# Patient Record
Sex: FEMALE | Race: BLACK OR AFRICAN AMERICAN | Employment: PART TIME | URBAN - METROPOLITAN AREA
[De-identification: names, ages, dates, MRNs, and addresses within clinical notes are randomized per-mention and may not be internally consistent; named-entity substitution may affect disease eponyms.]

---

## 2017-05-27 ENCOUNTER — HOSPITAL ENCOUNTER (EMERGENCY)
Age: 37
Discharge: OTHER INSTITUTION | DRG: 460 | End: 2017-05-28
Attending: EMERGENCY MEDICINE
Payer: COMMERCIAL

## 2017-05-27 ENCOUNTER — APPOINTMENT (OUTPATIENT)
Dept: CT IMAGING | Age: 37
DRG: 460 | End: 2017-05-27
Attending: EMERGENCY MEDICINE
Payer: COMMERCIAL

## 2017-05-27 DIAGNOSIS — N17.9 ACUTE KIDNEY INJURY (HCC): ICD-10-CM

## 2017-05-27 DIAGNOSIS — K85.10 ACUTE BILIARY PANCREATITIS, UNSPECIFIED COMPLICATION STATUS: Primary | ICD-10-CM

## 2017-05-27 DIAGNOSIS — R17 JAUNDICE: ICD-10-CM

## 2017-05-27 DIAGNOSIS — K72.00 ACUTE LIVER FAILURE WITHOUT HEPATIC COMA: ICD-10-CM

## 2017-05-27 LAB
ALBUMIN SERPL BCP-MCNC: 2.1 G/DL (ref 3.5–5)
ALBUMIN/GLOB SERPL: 0.5 {RATIO} (ref 1.2–3.5)
ALP SERPL-CCNC: 845 U/L (ref 50–136)
ALT SERPL-CCNC: 274 U/L (ref 12–65)
ANION GAP BLD CALC-SCNC: 18 MMOL/L (ref 7–16)
AST SERPL W P-5'-P-CCNC: 378 U/L (ref 15–37)
BASOPHILS # BLD AUTO: 0.1 K/UL (ref 0–0.2)
BASOPHILS # BLD: 0 % (ref 0–2)
BILIRUB SERPL-MCNC: 36.2 MG/DL (ref 0.2–1.1)
BUN SERPL-MCNC: 91 MG/DL (ref 6–23)
CALCIUM SERPL-MCNC: 8.7 MG/DL (ref 8.3–10.4)
CHLORIDE SERPL-SCNC: 99 MMOL/L (ref 98–107)
CO2 SERPL-SCNC: 22 MMOL/L (ref 21–32)
CREAT SERPL-MCNC: 6.21 MG/DL (ref 0.6–1)
DIFFERENTIAL METHOD BLD: ABNORMAL
EOSINOPHIL # BLD: 0.1 K/UL (ref 0–0.8)
EOSINOPHIL NFR BLD: 1 % (ref 0.5–7.8)
ERYTHROCYTE [DISTWIDTH] IN BLOOD BY AUTOMATED COUNT: 24.8 % (ref 11.9–14.6)
GLOBULIN SER CALC-MCNC: 4.3 G/DL (ref 2.3–3.5)
GLUCOSE SERPL-MCNC: 144 MG/DL (ref 65–100)
HCG UR QL: NEGATIVE
HCT VFR BLD AUTO: 30.6 % (ref 35.8–46.3)
HGB BLD-MCNC: 11 G/DL (ref 11.7–15.4)
IMM GRANULOCYTES # BLD: 0.2 K/UL (ref 0–0.5)
IMM GRANULOCYTES NFR BLD AUTO: 1.3 % (ref 0–5)
INR PPP: 2 (ref 0.9–1.2)
LIPASE SERPL-CCNC: 3273 U/L (ref 73–393)
LYMPHOCYTES # BLD AUTO: 13 % (ref 13–44)
LYMPHOCYTES # BLD: 1.8 K/UL (ref 0.5–4.6)
MCH RBC QN AUTO: 29.7 PG (ref 26.1–32.9)
MCHC RBC AUTO-ENTMCNC: 36.9 G/DL (ref 31.4–35)
MCV RBC AUTO: 80.5 FL (ref 79.6–97.8)
MONOCYTES # BLD: 1.4 K/UL (ref 0.1–1.3)
MONOCYTES NFR BLD AUTO: 10 % (ref 4–12)
NEUTS SEG # BLD: 10.2 K/UL (ref 1.7–8.2)
NEUTS SEG NFR BLD AUTO: 75 % (ref 43–78)
PLATELET # BLD AUTO: 149 K/UL (ref 150–450)
PMV BLD AUTO: ABNORMAL FL (ref 10.8–14.1)
POTASSIUM SERPL-SCNC: 4.5 MMOL/L (ref 3.5–5.1)
PROT SERPL-MCNC: 6.4 G/DL (ref 6.3–8.2)
PROTHROMBIN TIME: 21.2 SEC (ref 9.6–12)
RBC # BLD AUTO: 3.8 M/UL (ref 4.05–5.25)
SODIUM SERPL-SCNC: 139 MMOL/L (ref 136–145)
WBC # BLD AUTO: 13.8 K/UL (ref 4.3–11.1)

## 2017-05-27 RX ORDER — SODIUM CHLORIDE 9 MG/ML
500 INJECTION, SOLUTION INTRAVENOUS ONCE
Status: DISCONTINUED | OUTPATIENT
Start: 2017-05-27 | End: 2017-05-28 | Stop reason: HOSPADM

## 2017-05-27 RX ORDER — ONDANSETRON 2 MG/ML
4 INJECTION INTRAMUSCULAR; INTRAVENOUS
Status: COMPLETED | OUTPATIENT
Start: 2017-05-27 | End: 2017-05-27

## 2017-05-27 RX ORDER — SODIUM CHLORIDE 9 MG/ML
125 INJECTION, SOLUTION INTRAVENOUS ONCE
Status: COMPLETED | OUTPATIENT
Start: 2017-05-27 | End: 2017-05-27

## 2017-05-27 RX ORDER — FUROSEMIDE 20 MG/1
TABLET ORAL DAILY
COMMUNITY
End: 2017-05-28

## 2017-05-27 RX ORDER — SPIRONOLACTONE 50 MG/1
TABLET, FILM COATED ORAL DAILY
COMMUNITY
End: 2017-05-28

## 2017-05-27 RX ORDER — HYDROMORPHONE HYDROCHLORIDE 1 MG/ML
0.5 INJECTION, SOLUTION INTRAMUSCULAR; INTRAVENOUS; SUBCUTANEOUS
Status: COMPLETED | OUTPATIENT
Start: 2017-05-27 | End: 2017-05-27

## 2017-05-27 RX ADMIN — SODIUM CHLORIDE 1000 ML: 900 INJECTION, SOLUTION INTRAVENOUS at 22:22

## 2017-05-27 RX ADMIN — HYDROMORPHONE HYDROCHLORIDE 0.5 MG: 1 INJECTION, SOLUTION INTRAMUSCULAR; INTRAVENOUS; SUBCUTANEOUS at 23:29

## 2017-05-27 RX ADMIN — SODIUM CHLORIDE 125 ML/HR: 900 INJECTION, SOLUTION INTRAVENOUS at 23:29

## 2017-05-27 RX ADMIN — ONDANSETRON 4 MG: 2 INJECTION INTRAMUSCULAR; INTRAVENOUS at 23:29

## 2017-05-28 ENCOUNTER — HOSPITAL ENCOUNTER (INPATIENT)
Age: 37
LOS: 1 days | Discharge: OTHER INSTITUTION | DRG: 460 | End: 2017-05-28
Attending: INTERNAL MEDICINE | Admitting: INTERNAL MEDICINE
Payer: COMMERCIAL

## 2017-05-28 ENCOUNTER — APPOINTMENT (OUTPATIENT)
Dept: ULTRASOUND IMAGING | Age: 37
DRG: 460 | End: 2017-05-28
Attending: INTERNAL MEDICINE
Payer: COMMERCIAL

## 2017-05-28 VITALS
TEMPERATURE: 98.1 F | DIASTOLIC BLOOD PRESSURE: 54 MMHG | WEIGHT: 144 LBS | BODY MASS INDEX: 24.72 KG/M2 | RESPIRATION RATE: 16 BRPM | OXYGEN SATURATION: 100 % | SYSTOLIC BLOOD PRESSURE: 91 MMHG | HEART RATE: 84 BPM

## 2017-05-28 VITALS
TEMPERATURE: 97.4 F | DIASTOLIC BLOOD PRESSURE: 52 MMHG | HEART RATE: 72 BPM | SYSTOLIC BLOOD PRESSURE: 96 MMHG | OXYGEN SATURATION: 96 % | RESPIRATION RATE: 18 BRPM

## 2017-05-28 PROBLEM — N17.9 AKI (ACUTE KIDNEY INJURY) (HCC): Status: ACTIVE | Noted: 2017-05-28

## 2017-05-28 PROBLEM — K75.4 AUTOIMMUNE HEPATITIS (HCC): Status: ACTIVE | Noted: 2017-05-28

## 2017-05-28 PROBLEM — K85.90 ACUTE PANCREATITIS: Status: ACTIVE | Noted: 2017-05-28

## 2017-05-28 PROBLEM — Z94.4 LIVER TRANSPLANT STATUS (HCC): Status: ACTIVE | Noted: 2017-05-28

## 2017-05-28 LAB
ALBUMIN SERPL BCP-MCNC: 1.8 G/DL (ref 3.5–5)
ALBUMIN/GLOB SERPL: 0.5 {RATIO} (ref 1.2–3.5)
ALP SERPL-CCNC: 833 U/L (ref 50–136)
ALT SERPL-CCNC: 239 U/L (ref 12–65)
ANION GAP BLD CALC-SCNC: 15 MMOL/L (ref 7–16)
APPEARANCE UR: CLEAR
AST SERPL W P-5'-P-CCNC: 324 U/L (ref 15–37)
BACTERIA URNS QL MICRO: ABNORMAL /HPF
BASOPHILS # BLD AUTO: 0 K/UL (ref 0–0.2)
BASOPHILS # BLD: 0 % (ref 0–2)
BILIRUB SERPL-MCNC: 30.2 MG/DL (ref 0.2–1.1)
BILIRUB UR QL: ABNORMAL
BUN SERPL-MCNC: 83 MG/DL (ref 6–23)
CALCIUM SERPL-MCNC: 8.3 MG/DL (ref 8.3–10.4)
CASTS URNS QL MICRO: ABNORMAL /LPF
CHLORIDE SERPL-SCNC: 106 MMOL/L (ref 98–107)
CO2 SERPL-SCNC: 21 MMOL/L (ref 21–32)
COLOR UR: ABNORMAL
CREAT SERPL-MCNC: 5.65 MG/DL (ref 0.6–1)
CREAT UR-MCNC: 56.1 MG/DL
DIFFERENTIAL METHOD BLD: ABNORMAL
EOSINOPHIL # BLD: 0.1 K/UL (ref 0–0.8)
EOSINOPHIL NFR BLD: 1 % (ref 0.5–7.8)
EPI CELLS #/AREA URNS HPF: ABNORMAL /HPF
ERYTHROCYTE [DISTWIDTH] IN BLOOD BY AUTOMATED COUNT: 25.9 % (ref 11.9–14.6)
GLOBULIN SER CALC-MCNC: 4 G/DL (ref 2.3–3.5)
GLUCOSE SERPL-MCNC: 96 MG/DL (ref 65–100)
GLUCOSE UR STRIP.AUTO-MCNC: NEGATIVE MG/DL
HCT VFR BLD AUTO: 31.5 % (ref 35.8–46.3)
HGB BLD-MCNC: 11.7 G/DL (ref 11.7–15.4)
HGB UR QL STRIP: NEGATIVE
IMM GRANULOCYTES # BLD: 0.1 K/UL (ref 0–0.5)
IMM GRANULOCYTES NFR BLD AUTO: 0.9 % (ref 0–5)
KETONES UR QL STRIP.AUTO: NEGATIVE MG/DL
LEUKOCYTE ESTERASE UR QL STRIP.AUTO: ABNORMAL
LIPASE SERPL-CCNC: 2380 U/L (ref 73–393)
LYMPHOCYTES # BLD AUTO: 16 % (ref 13–44)
LYMPHOCYTES # BLD: 1.8 K/UL (ref 0.5–4.6)
MCH RBC QN AUTO: 30 PG (ref 26.1–32.9)
MCHC RBC AUTO-ENTMCNC: 37.1 G/DL (ref 31.4–35)
MCV RBC AUTO: 80.8 FL (ref 79.6–97.8)
MONOCYTES # BLD: 1 K/UL (ref 0.1–1.3)
MONOCYTES NFR BLD AUTO: 9 % (ref 4–12)
NEUTS SEG # BLD: 8.3 K/UL (ref 1.7–8.2)
NEUTS SEG NFR BLD AUTO: 73 % (ref 43–78)
NITRITE UR QL STRIP.AUTO: NEGATIVE
PH UR STRIP: 6 [PH] (ref 5–9)
PLATELET # BLD AUTO: 98 K/UL (ref 150–450)
PMV BLD AUTO: ABNORMAL FL (ref 10.8–14.1)
POTASSIUM SERPL-SCNC: 3.6 MMOL/L (ref 3.5–5.1)
PROT SERPL-MCNC: 5.8 G/DL (ref 6.3–8.2)
PROT UR STRIP-MCNC: NEGATIVE MG/DL
RBC # BLD AUTO: 3.9 M/UL (ref 4.05–5.25)
RBC #/AREA URNS HPF: ABNORMAL /HPF
SODIUM SERPL-SCNC: 142 MMOL/L (ref 136–145)
SODIUM UR-SCNC: 78 MMOL/L
SP GR UR REFRACTOMETRY: 1.01 (ref 1–1.02)
UROBILINOGEN UR QL STRIP.AUTO: 0.2 EU/DL (ref 0.2–1)
WBC # BLD AUTO: 11.3 K/UL (ref 4.3–11.1)
WBC URNS QL MICRO: ABNORMAL /HPF

## 2017-05-28 PROCEDURE — 36415 COLL VENOUS BLD VENIPUNCTURE: CPT | Performed by: INTERNAL MEDICINE

## 2017-05-28 PROCEDURE — 65270000029 HC RM PRIVATE

## 2017-05-28 PROCEDURE — 84300 ASSAY OF URINE SODIUM: CPT | Performed by: INTERNAL MEDICINE

## 2017-05-28 PROCEDURE — 85025 COMPLETE CBC W/AUTO DIFF WBC: CPT | Performed by: INTERNAL MEDICINE

## 2017-05-28 PROCEDURE — 77030033269 HC SLV COMPR SCD KNE2 CARD -B

## 2017-05-28 PROCEDURE — 83690 ASSAY OF LIPASE: CPT | Performed by: INTERNAL MEDICINE

## 2017-05-28 PROCEDURE — 87086 URINE CULTURE/COLONY COUNT: CPT | Performed by: INTERNAL MEDICINE

## 2017-05-28 PROCEDURE — 81001 URINALYSIS AUTO W/SCOPE: CPT | Performed by: INTERNAL MEDICINE

## 2017-05-28 PROCEDURE — 82570 ASSAY OF URINE CREATININE: CPT | Performed by: INTERNAL MEDICINE

## 2017-05-28 PROCEDURE — 76700 US EXAM ABDOM COMPLETE: CPT

## 2017-05-28 PROCEDURE — 80053 COMPREHEN METABOLIC PANEL: CPT | Performed by: INTERNAL MEDICINE

## 2017-05-28 PROCEDURE — 74011250636 HC RX REV CODE- 250/636: Performed by: INTERNAL MEDICINE

## 2017-05-28 RX ORDER — SODIUM CHLORIDE 0.9 % (FLUSH) 0.9 %
5-10 SYRINGE (ML) INJECTION AS NEEDED
Status: DISCONTINUED | OUTPATIENT
Start: 2017-05-28 | End: 2017-05-29 | Stop reason: HOSPADM

## 2017-05-28 RX ORDER — OXYCODONE HYDROCHLORIDE 5 MG/1
2.5 TABLET ORAL
Qty: 10 TAB | Refills: 0 | Status: SHIPPED
Start: 2017-05-28 | End: 2017-10-31

## 2017-05-28 RX ORDER — SODIUM CHLORIDE 0.9 % (FLUSH) 0.9 %
5-10 SYRINGE (ML) INJECTION EVERY 8 HOURS
Status: DISCONTINUED | OUTPATIENT
Start: 2017-05-28 | End: 2017-05-29 | Stop reason: HOSPADM

## 2017-05-28 RX ORDER — ONDANSETRON 2 MG/ML
4 INJECTION INTRAMUSCULAR; INTRAVENOUS
Status: DISCONTINUED | OUTPATIENT
Start: 2017-05-28 | End: 2017-05-29 | Stop reason: HOSPADM

## 2017-05-28 RX ORDER — SODIUM CHLORIDE 9 MG/ML
125 INJECTION, SOLUTION INTRAVENOUS CONTINUOUS
Qty: 1000 ML | Refills: 1 | Status: SHIPPED
Start: 2017-05-28 | End: 2017-10-31

## 2017-05-28 RX ORDER — TACROLIMUS 1 MG/1
1 CAPSULE ORAL EVERY 12 HOURS
Status: DISCONTINUED | OUTPATIENT
Start: 2017-05-28 | End: 2017-05-29 | Stop reason: HOSPADM

## 2017-05-28 RX ORDER — SODIUM CHLORIDE 9 MG/ML
200 INJECTION, SOLUTION INTRAVENOUS CONTINUOUS
Status: DISCONTINUED | OUTPATIENT
Start: 2017-05-28 | End: 2017-05-28

## 2017-05-28 RX ORDER — ONDANSETRON 8 MG/1
8 TABLET, ORALLY DISINTEGRATING ORAL ONCE
Status: DISCONTINUED | OUTPATIENT
Start: 2017-05-28 | End: 2017-05-29 | Stop reason: HOSPADM

## 2017-05-28 RX ORDER — TACROLIMUS 1 MG/1
4 CAPSULE ORAL EVERY 12 HOURS
Status: DISCONTINUED | OUTPATIENT
Start: 2017-05-28 | End: 2017-05-28

## 2017-05-28 RX ORDER — SODIUM CHLORIDE 9 MG/ML
250 INJECTION, SOLUTION INTRAVENOUS CONTINUOUS
Status: DISPENSED | OUTPATIENT
Start: 2017-05-28 | End: 2017-05-28

## 2017-05-28 RX ORDER — ONDANSETRON 2 MG/ML
4 INJECTION INTRAMUSCULAR; INTRAVENOUS
Qty: 1 VIAL | Refills: 0 | Status: SHIPPED
Start: 2017-05-28 | End: 2017-10-31

## 2017-05-28 RX ORDER — OXYCODONE HYDROCHLORIDE 5 MG/1
2.5 TABLET ORAL
Status: DISCONTINUED | OUTPATIENT
Start: 2017-05-28 | End: 2017-05-29 | Stop reason: HOSPADM

## 2017-05-28 RX ORDER — SODIUM CHLORIDE 0.9 % (FLUSH) 0.9 %
5-10 SYRINGE (ML) INJECTION EVERY 8 HOURS
Qty: 1 SYRINGE | Refills: 3 | Status: SHIPPED
Start: 2017-05-28 | End: 2017-10-31

## 2017-05-28 RX ORDER — MYCOPHENOLATE MOFETIL 500 MG/1
1000 TABLET ORAL 2 TIMES DAILY
Status: DISCONTINUED | OUTPATIENT
Start: 2017-05-28 | End: 2017-05-29 | Stop reason: HOSPADM

## 2017-05-28 RX ADMIN — TACROLIMUS 1 MG: 1 CAPSULE ORAL at 20:51

## 2017-05-28 RX ADMIN — MYCOPHENOLATE MOFETIL 1000 MG: 500 TABLET, FILM COATED ORAL at 16:20

## 2017-05-28 RX ADMIN — Medication 10 ML: at 06:00

## 2017-05-28 RX ADMIN — Medication 10 ML: at 14:00

## 2017-05-28 RX ADMIN — METHYLPREDNISOLONE SODIUM SUCCINATE 40 MG: 40 INJECTION, POWDER, FOR SOLUTION INTRAMUSCULAR; INTRAVENOUS at 05:14

## 2017-05-28 RX ADMIN — TACROLIMUS 1 MG: 1 CAPSULE ORAL at 09:57

## 2017-05-28 RX ADMIN — METHYLPREDNISOLONE SODIUM SUCCINATE 40 MG: 40 INJECTION, POWDER, FOR SOLUTION INTRAMUSCULAR; INTRAVENOUS at 16:19

## 2017-05-28 RX ADMIN — SODIUM CHLORIDE 200 ML/HR: 900 INJECTION, SOLUTION INTRAVENOUS at 08:23

## 2017-05-28 RX ADMIN — METHYLPREDNISOLONE SODIUM SUCCINATE 40 MG: 40 INJECTION, POWDER, FOR SOLUTION INTRAMUSCULAR; INTRAVENOUS at 09:59

## 2017-05-28 RX ADMIN — MYCOPHENOLATE MOFETIL 1000 MG: 500 TABLET, FILM COATED ORAL at 09:57

## 2017-05-28 RX ADMIN — ONDANSETRON 4 MG: 2 INJECTION INTRAMUSCULAR; INTRAVENOUS at 20:51

## 2017-05-28 RX ADMIN — SODIUM CHLORIDE 200 ML/HR: 900 INJECTION, SOLUTION INTRAVENOUS at 04:00

## 2017-05-28 RX ADMIN — SODIUM CHLORIDE 250 ML/HR: 900 INJECTION, SOLUTION INTRAVENOUS at 16:23

## 2017-05-28 NOTE — ED NOTES
TRANSFER - OUT REPORT:    Verbal report given to Nathalia Montero RN(name) on Klarissa Aviles  being transferred to [facility] Rm 612(unit) for routine progression of care       Report consisted of patients Situation, Background, Assessment and   Recommendations(SBAR). Information from the following report(s) ED Summary was reviewed with the receiving nurse. Lines:   Peripheral IV 05/27/17 Right Hand (Active)   Site Assessment Clean, dry, & intact 5/27/2017 10:00 PM   Phlebitis Assessment 0 5/27/2017 10:00 PM   Infiltration Assessment 0 5/27/2017 10:00 PM        Opportunity for questions and clarification was provided.       Patient transported with:   IVF

## 2017-05-28 NOTE — ED NOTES
Agree w/ triage note, had a recent hospitalization at [facility]- [address] [place] [address] w/ DC summary on pt. She states that she began to see jaundice to B eyes since Easter but the color has deepened over the last couple of weeks. Increased N/V, abd distention though is soft at this time, family at [facility], will cont to monitor.

## 2017-05-28 NOTE — DISCHARGE SUMMARY
Patient ID:  Genie Morrow  [id number]  29 y.o.  1980  Admit date: 5/28/2017  2:40 AM  Discharge date and time: 5/28/2017  Attending: Lori Escudero MD  PCP:  Lenore Fountain MD  Treatment Team: Attending Provider: Lori Escudero MD; Consulting Provider: Melissa Moss MD    Principal Diagnosis MICHAEL (acute kidney injury) [facility])   Principal Problem:    MICHAEL (acute kidney [facility]) ([facility] (5/28/2017)    Active Problems:    Autoimmune hepatitis ([facility] (5/28/2017)      Liver transplant status ([facility] (5/28/2017)      Acute pancreatitis (5/28/2017)             Hospital Course:  Please refer to the admission H&P for details of presentation. In summary, the patient is Genie Morrow is a 44 y.o.  female who presents with LUQ abd pain with nausea and vomiting. Her abd pain started about 1 week ago and her NV started yesterday. She has hx of liver transplant due to autoimmune hepatitis in 2001. She has had multiple episodes of acute rejection and there has been questions of compliance with her meds. She was recently discharged from [address] in April for this problem. Her labs have shown elevated bili, LFT's but normal Creatine. On May 3rd, review of telephone encounters show that she was started on lasix 40 mg and aldactone 100 mg daily for ascites and edema. The patient reports she has lost 20 lbs since then. No fever or chills. She is also on prednisone taper for her autoimmune hepatitis. She has also had a biliary stent placed in 2002 for stenosis and was removed in 2003. CT here is without any acute findings.      She was admitted and started on symptomatic management. GI here was consulted given concern for hepatorenal syndrome. Lipase elevation and quality of abdominal pain more suggestive of acute pancreatitis. Non-contrasted CT abd/pelvis this admission negative changes of pancreatitis. RUQ US negative for biliary dilation. Urine sodium is 78 making hepatorenal syndrome unlikely.  Possible MICHAEL secondary to recently started diuretics. Patient negative for EtOH use and RUQ US does not suggest choledocholithiasis. Possible pancreatitis secondary to recent initiation of lasix. Abdominal pain is improving. Tolerating sips and chips. INR is 2.0. MELD is 40. GI spoke with Dr. Kika Ledesma at [address] and the patient has been accepted there for further management and treatment. Significant Diagnostic Studies:       Labs: Results:       Chemistry Recent Labs      05/28/17   0608  05/27/17   2200   GLU  96  144*   NA  142  139   K  3.6  4.5   CL  106  99   CO2  21  22   BUN  83*  91*   CREA  5.65*  6.21*   CA  8.3  8.7   AGAP  15  18*   AP  833*  845*   TP  5.8*  6.4   ALB  1.8*  2.1*   GLOB  4.0*  4.3*   AGRAT  0.5*  0.5*      CBC w/Diff Recent Labs      05/28/17   0608 05/27/17   2200   WBC  11.3*  13.8*   RBC  3.90*  3.80*   HGB  11.7  11.0*   HCT  31.5*  30.6*   PLT  98*  149*   GRANS  73  75   LYMPH  16  13   EOS  1  1      Cardiac Enzymes No results for input(s): CPK, CKND1, DAYO in the last 72 hours. No lab exists for component: CKRMB, TROIP   Coagulation Recent Labs      05/27/17   2200   PTP  21.2*   INR  2.0*       Lipid Panel No results found for: CHOL, CHOLPOCT, CHOLX, CHLST, CHOLV, [id number], HDL, LDL, NLDLCT, DLDL, LDLC, DLDLP, [id number], VLDLC, VLDL, TGL, TGLX, TRIGL, [id number], TRIGP, TGLPOCT, [id number], CHHD, CHHDX   BNP No results for input(s): BNPP in the last 72 hours. Liver Enzymes Recent Labs      05/28/17   0608   TP  5.8*   ALB  1.8*   AP  833*   SGOT  324*      Thyroid Studies No results found for: T4, T3U, TSH, TSHEXT       No results found for this or any previous visit. CT Results (most recent):    Results from Hospital Encounter encounter on 05/27/17   CT ABD PELV WO CONT   Narrative CT ABDOMEN AND PELVIS WITHOUT CONTRAST    HISTORY: Jaundice status post liver transplant    COMPARISON: None.     TECHNIQUE: Helical imaging was performed from the lung bases through the ischial  tuberosities without intravenous contrast. Oral contrast was not administered. Coronal and sagittal reformats were performed. Dose reduction techniques used: Automated exposure control, adjustment of the  mAs and/or kVp according to patient's size, and iterative reconstruction  techniques. FINDINGS:   *  LUNG BASES: Within normal limits. *  LIVER: Status post liver transplant. Small amount of right anterior  subcapsular fluid. A question this could be related to prior biopsy. *  GALLBLADDER AND BILE DUCTS: Status post post cholecystectomy. *  SPLEEN: Splenomegaly with the spleen measuring 14.0 cm.  *  URINARY TRACT: Within normal limits. *  ADRENALS: Within normal limits. *  PANCREAS: Within normal limits. *  GASTROINTESTINAL TRACT: Thickening of the right colon which can be related to  portal hypertension. *  RETROPERITONEUM: Within normal limits. *  PERITONEAL CAVITY AND ABDOMINAL WALL: Right subhepatic and pelvic ascites. *  PELVIS: Pelvic ascites. *  SPINE / BONES: Within normal limits. *  OTHER COMMENTS: None. Impression IMPRESSION:     Status post liver transplant. Small amount of right anterior hepatic subcapsular fluid possibly related to  prior biopsy. Splenomegaly. Status post cholecystectomy. Thickening the right colon which can be related to portal hypertension. Ascites. Evaluation of the abdominal viscera is limited without intravenous contrast.    Date of Dictation: 5/28/2017 12:14 AM        VAS/US Results (most recent):  No results found for this or any previous visit. XR Results (most recent):  No results found for this or any previous visit.     Discharge Exam:  Visit Vitals    BP 93/62    Pulse 68    Temp 97.4 °F (36.3 °C)    Resp 17    LMP 04/27/2017    SpO2 97%     General appearance: alert, cooperative, no distress, appears stated age  Lungs: clear to auscultation bilaterally  Heart: regular rate and rhythm, S1, S2 normal, no murmur, click, rub or gallop  Abdomen: soft, non-tender. Bowel sounds normal. No masses,  no organomegaly  Extremities: no cyanosis or edema  Neurologic: Grossly normal    Disposition: [facility]  Discharge Condition: stable  Patient Instructions:   Current Discharge Medication List      START taking these medications    Details   methylPREDNISolone, PF, (SOLU-MEDROL) 125 mg/2 mL solr 0.96 mL by IntraVENous route daily. Qty: 1 Vial, Refills: 0      ondansetron (ZOFRAN) 4 mg/2 mL soln 2 mL by IntraVENous route every four (4) hours as needed. Qty: 1 Vial, Refills: 0      oxyCODONE IR (ROXICODONE) 5 mg immediate release tablet Take 0.5 Tabs by mouth every four (4) hours as needed. Max Daily Amount: 15 mg.  Qty: 10 Tab, Refills: 0      sodium chloride (NS) 0.9 % 5-10 mL by IntraVENous route every eight (8) hours. Qty: 1 Syringe, Refills: 3      0.9% sodium chloride solution 125 mL/hr by IntraVENous route continuous. Qty: 1000 mL, Refills: 1         CONTINUE these medications which have NOT CHANGED    Details   CELLCEPT 500 mg Tab take 1,000 mg by mouth two (2) times a day. PROGRAF 1 mg Cap Take 1 mg by mouth two (2) times a day.          STOP taking these medications       PREDNISONE Comments:   Reason for Stopping:         spironolactone (ALDACTONE) 50 mg tablet Comments:   Reason for Stopping:         furosemide (LASIX) 20 mg tablet Comments:   Reason for Stopping:               Activity: As tolerated  Diet: NPO with sips of clear fluids and ice chips  Wound Care: none    Follow-up  ·   Dr. Jerome Berg  Time spent to discharge patient greater than 30 minutes  Signed:  Gary Moyer MD  5/28/2017  6:54 PM

## 2017-05-28 NOTE — CONSULTS
[facility] Consult Note       Referring Physician:  Dr. Eri Washburn. [place]    Consult Date:  2017    Admit Date:  2017    Chief Complaint:  Autoimmune hepatitis, Status-post OLT    Subjective:     History of Present Illness:  Patient is a 44 y.o. AAF with h/o AI hepatitis, hepatopulmonary syndrome, OLT in , and ascites who is seen in consultation at the request of Dr. Enrrique Roman for jaundice, abnormal LFT's, lipase elevation, N/V, and abdominal pain. Patient also noted to have MICHAEL with Cr 5.65 and urine Na 78. Patient recently admitted at [address] with worsening liver function. Was placed on prednisone as well as lasix/spironolactone. Reports significant weight loss following diuretics. Currently takes Prograf 1 mg daily and Cellcept 1g PO BID. Neither of the medications are new however patient reports Prograf recently decreased from 2 mg to 1 mg daily. Prior OLT complicated by biliary stricture requiring repeat ERCP with stent placement with last stent removed in . Lipase this admission elevated at 3272. CT abd/pelvis without contrast negative for changes pancreas. RUQ US negative for biliary dilation. Currently states abdominal pain and nausea much improved. Tolerating sips liquids and ice chips. Denies EtOH use. Negative for fevers/chills. Lasix was recently started. Denies new medications other than diuretics. Denies prior h/o pancreatitis. Positive BM today with small red blood on tissue. Denies other episodes of rectal bleeding and melena. Denies hematemesis. Reports that she is compliant with prograf/cellcept/steroids. Current INR is 2.0. MELD score is 40.     PMH:  Autoimmune hepatitis  Hepatopulmonary syndrome  Cervical cancer (LEAP)    PSH:  Past Surgical History:   Procedure Laterality Date    HX  SECTION      HX TRANSPLANT      liver   Liver transplant   Multiple ERCP's with stent in , for biliary stricture    Allergies:  No Known Allergies    Home Medications:  Prior to Admission medications    Medication Sig Start Date End Date Taking? Authorizing Provider   PREDNISONE by Does Not Apply route. Yes Coco Guerrero MD   spironolactone (ALDACTONE) 50 mg tablet Take  by mouth daily. Yes Coco Guerrero MD   CELLCEPT 500 mg Tab take 1,000 mg by mouth two (2) times a day. Yes Coco Guerrero MD   PROGRAF 1 mg Cap Take 1 mg by mouth two (2) times a day. Yes Coco Guerrero MD   furosemide (LASIX) 20 mg tablet Take  by mouth daily. Coco Guerrero MD       Hospital Medications:  Current Facility-Administered Medications   Medication Dose Route Frequency    mycophenolate (CELLCEPT) tablet 1,000 mg  1,000 mg Oral BID    sodium chloride (NS) flush 5-10 mL  5-10 mL IntraVENous Q8H    sodium chloride (NS) flush 5-10 mL  5-10 mL IntraVENous PRN    ondansetron (ZOFRAN) injection 4 mg  4 mg IntraVENous Q4H PRN    methylPREDNISolone (PF) (SOLU-MEDROL) injection 40 mg  40 mg IntraVENous Q6H    tacrolimus (PROGRAF) capsule 1 mg  1 mg Oral Q12H    oxyCODONE IR (ROXICODONE) tablet 2.5 mg  2.5 mg Oral Q4H PRN    0.9% sodium chloride infusion  250 mL/hr IntraVENous CONTINUOUS       Social History:  Social History   Substance Use Topics    Smoking status: Never Smoker    Smokeless tobacco: Not on file    Alcohol use No         Family History:  No family history on file. Review of Systems:  A detailed 10 system ROS is obtained, with pertinent positives as listed above. All others are negative. Diet:  Sips and chips    Objective:     Physical Exam:  Vitals:  Visit Vitals    BP (!) 88/60    Pulse 66    Temp 97.5 °F (36.4 °C)    Resp 18    LMP 04/27/2017    SpO2 99%     Gen:  No acute distress  HEENT:  Icteric sclera, mmm  Cardiovascular: Regular rate and rhythm.    Respiratory:  CTA bilaterally  GI:  Abd soft, ND, +BS, minimal epigastric tenderness, neg rebound/guarding  Rectal:  Deferred  Neurological:  AAOx3, Neg asterixis  Psychiatric: Full affect      Laboratory:    Recent Labs      05/28/17   0608  05/27/17   2200   WBC  11.3*  13.8*   HGB  11.7  11.0*   HCT  31.5*  30.6*   PLT  98*  149*   MCV  80.8  80.5   NA  142  139   K  3.6  4.5   CL  106  99   CO2  21  22   BUN  83*  91*   CREA  5.65*  6.21*   CA  8.3  8.7   GLU  96  144*   AP  833*  845*   SGOT  324*  378*   ALB  1.8*  2.1*   TP  5.8*  6.4   LPSE  2380*  3273*   PTP   --   21.2*   INR   --   2.0*          Assessment:       Principal Problem:    MICHAEL [facility]acute kidney injury) [facility] (5/28/2017)    Active Problems:    Autoimmune hepatitis ([facility] (5/28/2017)      L[facility] (5/28/2017)      Acute pancreatitis (5/28/2017)      Patient is a 43 yo female with h/o autoimmune hepatitis s/p OLT in 2002, multiple ERCP's for biliary stricture in 2002/2003, hepatopulmonary syndrome, ascites who is here with N/V, abdominal pain, elevated lipase consistent with pancreatitis, MICHAEL, and worsening of LFT's. Reports Prograf recently decreased from 2 mg to 1 mg daily. On Cellcept 1 g BID. Recent steroid taper. Lipase elevation and quality of abdominal pain suggestive of acute pancreatitis. Non-contrasted CT abd/pelvis this admission negative changes of pancreatitis. RUQ US negative for biliary dilation. Urine sodium is 78 making hepatorenal syndrome unlikely. Possible MICHAEL secondary to recently started diuretics. Patient negative for EtOH use and RUQ US does not suggest choledocholithiasis. Possible pancreatitis secondary to recent initiation of lasix. Abdominal pain is improving. Tolerating sips and chips. INR is 2.0. MELD is 40. Plan:       1. Holding diuretics  2. Continue IV fluids  3. Follow daily BUN/Cr. Will follow-up urine culture. 4. Continue Cellcept and Prograf  5. Reduce solumedrol to 60 mg IV daily  6. Daily CMP and INR  7. Advance diet in AM if abdominal pain continues to improve  8. Discussed case with Dr. Carin Thorne at [address] who accepted patient for transfer  9.  If there is a delay in obtaining a bed at [address] will consult Nephrology  10. Minimal rectal bleeding this AM with no recurrence. Suspect hemorrhoidal bleeding. Will monitor.     Savita Dewitt MD  [facility]

## 2017-05-28 NOTE — ED NOTES
MD currently at [facility], records from [address] pulled and available for review.

## 2017-05-28 NOTE — ED NOTES
Estefany Pete at [facility] to transport to [facility], pt assisted to bathroom prior to leaving via w/c. IVF remain in place, Maddie Grewal on 6th floor notified of pt departure.

## 2017-05-28 NOTE — H&P
History and Physical    Subjective:     Dorthea Rubinstein Major is a 44 y.o.  female who presents with LUQ abd pain with nausea and vomiting. Her abd pain started about 1 week ago and her NV started yesterday. She has hx of liver transplant due to autoimmune hepatitis in . She has had multiple episodes of acute rejection and there has been questions of compliance with her meds. She was recently discharged from [address] in April for this problem. Her labs have shown elevated bili, LFT's but normal Creatine. On May 3rd, review of telephone encounters show that she was started on lasix 40 mg and aldactone 100 mg daily for ascites and edema. The patient reports she has lost 20 lbs since then. No fever or chills. She is also on prednisone taper for her autoimmune hepatitis. She has also had a biliary stent placed in  for stenosis and was removed in . CT here is without any acute findings. Past Medical History:   Diagnosis Date    Infectious disease     hepatitis c    Liver disease     transplant       Past Surgical History:   Procedure Laterality Date    HX  SECTION      HX TRANSPLANT      liver     No family history on file. Social History   Substance Use Topics    Smoking status: Never Smoker    Smokeless tobacco: Not on file    Alcohol use No       Prior to Admission medications    Medication Sig Start Date End Date Taking? Authorizing Provider   PREDNISONE by Does Not Apply route. Coco Guerrero MD   furosemide (LASIX) 20 mg tablet Take  by mouth daily. Coco Guerrero MD   spironolactone (ALDACTONE) 50 mg tablet Take  by mouth daily. Coco Guerrero MD   CELLCEPT 500 mg Tab take 1,000 mg by mouth two (2) times a day. Coco Guerrero MD   PROGRAF 1 mg Cap take 4 mg by mouth every twelve (12) hours. Coco Guerrero MD     No Known Allergies     Review of Systems:  A comprehensive review of systems was negative except for that written in the History of Present Illness. Objective: Intake and Output:            Physical Exam:   Visit Vitals    LMP 04/27/2017     General appearance: alert, cooperative, no distress, appears stated age  Head: Normocephalic, without obvious abnormality, atraumatic  Eyes: positive findings: sclera icteric  Throat: abnormal findings: dry mm  Neck: supple, symmetrical, trachea midline, no adenopathy, thyroid: not enlarged, symmetric, no tenderness/mass/nodules, no carotid bruit and no JVD  Lungs: clear to auscultation bilaterally  Heart: regular rate and rhythm, S1, S2 normal, no murmur, click, rub or gallop  Abdomen: soft, non-tender. Bowel sounds normal. No masses,  no organomegaly  Extremities: extremities normal, atraumatic, no cyanosis or edema  Pulses: 2+ and symmetric  Skin: Skin color, texture, turgor normal. No rashes or lesions  Neurologic: Grossly normal        Data Review:   Recent Results (from the past 24 hour(s))   LIPASE    Collection Time: 05/27/17 10:00 PM   Result Value Ref Range    Lipase 3273 (H) 73 - 066 U/L   METABOLIC PANEL, COMPREHENSIVE    Collection Time: 05/27/17 10:00 PM   Result Value Ref Range    Sodium 139 136 - 145 mmol/L    Potassium 4.5 3.5 - 5.1 mmol/L    Chloride 99 98 - 107 mmol/L    CO2 22 21 - 32 mmol/L    Anion gap 18 (H) 7 - 16 mmol/L    Glucose 144 (H) 65 - 100 mg/dL    BUN 91 (H) 6 - 23 MG/DL    Creatinine 6.21 (H) 0.6 - 1.0 MG/DL    GFR est AA 10 (L) >60 ml/min/1.73m2    GFR est non-AA 8 (L) >60 ml/min/1.73m2    Calcium 8.7 8.3 - 10.4 MG/DL    Bilirubin, total 36.2 (H) 0.2 - 1.1 MG/DL    ALT (SGPT) 274 (H) 12 - 65 U/L    AST (SGOT) 378 (H) 15 - 37 U/L    Alk.  phosphatase 845 (H) 50 - 136 U/L    Protein, total 6.4 6.3 - 8.2 g/dL    Albumin 2.1 (L) 3.5 - 5.0 g/dL    Globulin 4.3 (H) 2.3 - 3.5 g/dL    A-G Ratio 0.5 (L) 1.2 - 3.5     CBC WITH AUTOMATED DIFF    Collection Time: 05/27/17 10:00 PM   Result Value Ref Range    WBC 13.8 (H) 4.3 - 11.1 K/uL    RBC 3.80 (L) 4.05 - 5.25 M/uL    HGB 11.0 (L) 11.7 - 15.4 g/dL    HCT 30.6 (L) 35.8 - 46.3 %    MCV 80.5 79.6 - 97.8 FL    MCH 29.7 26.1 - 32.9 PG    MCHC 36.9 (H) 31.4 - 35.0 g/dL    RDW 24.8 (H) 11.9 - 14.6 %    PLATELET 093 (L) 825 - 450 K/uL    MPV Cannot be calulated 10.8 - 14.1 FL    DF AUTOMATED      NEUTROPHILS 75 43 - 78 %    LYMPHOCYTES 13 13 - 44 %    MONOCYTES 10 4.0 - 12.0 %    EOSINOPHILS 1 0.5 - 7.8 %    BASOPHILS 0 0.0 - 2.0 %    IMMATURE GRANULOCYTES 1.3 0.0 - 5.0 %    ABS. NEUTROPHILS 10.2 (H) 1.7 - 8.2 K/UL    ABS. LYMPHOCYTES 1.8 0.5 - 4.6 K/UL    ABS. MONOCYTES 1.4 (H) 0.1 - 1.3 K/UL    ABS. EOSINOPHILS 0.1 0.0 - 0.8 K/UL    ABS. BASOPHILS 0.1 0.0 - 0.2 K/UL    ABS. IMM. GRANS. 0.2 0.0 - 0.5 K/UL   PROTHROMBIN TIME + INR    Collection Time: 05/27/17 10:00 PM   Result Value Ref Range    Prothrombin time 21.2 (H) 9.6 - 12.0 sec    INR 2.0 (H) 0.9 - 1.2     HCG URINE, QL. - POC    Collection Time: 05/27/17 11:42 PM   Result Value Ref Range    Pregnancy test,urine (POC) NEGATIVE  NEG           Assessment:     Principal Problem:    MICHAEL [facility]acute kidney [facility]) [facility] (5/28/2017)    Active Problems:    Autoimmune hepatitis ([facility] (5/28/2017)      Liver transplant status ([facility] (5/28/2017)      Acute pancreatitis (5/28/2017)        Plan:     Ad[place] bed [place]. I suspect her MICHAEL  Is due to over diuresis but certainly Hepatorenal syndrome could be in ddx but it has developed quickly and coincides with her diuretics starting. Resume antirejection meds  Start IV solumedrol for her autoimmune hepatitis. UA with lytes  Abd US to look for a possible stricture causing elevated lipase. May need MRCP or ERCP. Labs in AM.   Call Dr. Ambar Hayes (Heptologist at [facility] [place]) in AM for further recs or possible transfer.  [phone]  FULL CODE      Signed By: Júnior Chavez DO     May 28, 2017

## 2017-05-28 NOTE — PROGRESS NOTES
TRANSFER - IN REPORT:    Verbal report received from Mic[facility] on [address]  being received from [facility] for routine progression of care      Report consisted of patients Situation, Background, Assessment and   Recommendations(SBAR). Information from the following report(s) SBAR was reviewed with the receiving nurse. Opportunity for questions and clarification was provided. Assessment completed upon patients arrival to unit and care assumed.

## 2017-05-28 NOTE — PROGRESS NOTES
Dr. Alannah Montemayor notified of pt arrival from [place]. Pt oriented to room/floor and to call light. Instructed to call if needing any assistance; pt demonstrated understanding. SCD's in place; continuous IVF as ordered, U/A cup at bedside in which pt verbalizes understanding of needing to obtain a urine sample. Bed L/L, SR up X2, call light w/i reach, clutter-free environment. Pt states she is able to bring home meds if needed but none currently at bedside. Pt notified meds should be available from pharmacy. CNA obtaining VS at bedside. Admission assessment/ dual skin assessment completed with Rogelio Manriquez, OPAL at this time. Will continue to monitor.

## 2017-05-28 NOTE — PROGRESS NOTES
Care Management Interventions  Mode of Transport at Discharge: ALS [facility])  Transition of Care Consult (CM Consult): Other [facility] [address] phone [phone])  Plan discussed with Pt/Family/Caregiver: Yes  Freedom of Choice Offered: Yes  Discharge Location  Discharge Placement: Other: ([facility] [address] [facility]   Pt aware and in agreement with transfer to higher level of care.)

## 2017-05-28 NOTE — PROGRESS NOTES
Report called to Curt Mora RN at [address]. Supervisor and Primary MD notified. Social work is working on transfer time.

## 2017-05-28 NOTE — ED TRIAGE NOTES
Pt reports she had liver transplant about 15 years ago. Pt was told she was rejecting about 2 weeks ago at [address]. Pt reports jaundice has become worse as well as the bloating to her abdomen. Pt reports severe n/v.       Gisell Paris RN

## 2017-05-28 NOTE — ED PROVIDER NOTES
Patient is a 44 y.o. female presenting with abdominal pain. The history is provided by the patient. Abdominal Pain    This is a new problem. The current episode started more than 2 days ago. The problem occurs constantly. The problem has not changed since onset. Associated with: nausea. The pain is located in the epigastric region. The quality of the pain is aching. The pain is moderate. Associated symptoms include nausea. Pertinent negatives include no fever, no diarrhea, no melena, no vomiting, no constipation, no dysuria, no frequency, no headaches, no myalgias, no chest pain and no back pain. Nothing worsens the pain. The pain is relieved by nothing. liver transpl;ant 10 years ago       Past Medical History:   Diagnosis Date    Infectious disease     hepatitis c    Liver disease     transplant        Past Surgical History:   Procedure Laterality Date    HX  SECTION      HX TRANSPLANT      liver         History reviewed. No pertinent family history. Social History     Social History    Marital status: SINGLE     Spouse name: N/A    Number of children: N/A    Years of education: N/A     Occupational History    Not on file. Social History Main Topics    Smoking status: Never Smoker    Smokeless tobacco: Not on file    Alcohol use No    Drug use: No    Sexual activity: Yes     Partners: Male     Birth control/ protection: None     Other Topics Concern    Not on file     Social History Narrative         ALLERGIES: Review of patient's allergies indicates no known allergies. Review of Systems   Constitutional: Negative for chills and fever. HENT: Negative for congestion, rhinorrhea and sore throat. Eyes: Negative for photophobia and redness. Respiratory: Negative for cough and shortness of breath. Cardiovascular: Negative for chest pain and leg swelling. Gastrointestinal: Positive for abdominal pain and nausea. Negative for constipation, diarrhea, melena and vomiting. Endocrine: Negative for polydipsia and polyuria. Genitourinary: Negative for dysuria and frequency. Musculoskeletal: Negative for back pain and myalgias. Neurological: Negative for weakness, numbness and headaches. Vitals:    05/27/17 2128   BP: (!) 89/47   Pulse: 84   Resp: 16   Temp: 98.1 °F (36.7 °C)   SpO2: 97%   Weight: 65.3 kg (144 lb)            Physical Exam   Constitutional: She is oriented to person, place, and time. She appears well-developed and well-nourished. Eyes: Conjunctivae are normal. Pupils are equal, round, and reactive to light. Neck: Normal range of motion. Neck supple. Cardiovascular: Normal rate, regular rhythm and normal heart sounds. No murmur heard. Pulmonary/Chest: Breath sounds normal. No respiratory distress. Abdominal: Soft. She exhibits no distension. There is tenderness (epigastric and luq tenderness). There is no rebound and no guarding. Musculoskeletal: Normal range of motion. She exhibits no edema or tenderness. Neurological: She is alert and oriented to person, place, and time. She has normal strength. No sensory deficit. Skin: Skin is warm and dry. Nursing note and vitals reviewed. MDM  Number of Diagnoses or Management Options  Diagnosis management comments: Liver failure and kidney failure and acute pancreatitis. Discussed with hospitalist who will transfer patient downtown for continued management. IV hydration ordered. CT scan without contrast ordered.        Amount and/or Complexity of Data Reviewed  Clinical lab tests: ordered and reviewed (Results for orders placed or performed during the hospital encounter of 05/27/17  -LIPASE       Result                                            Value                         Ref Range                       Lipase                                            3273 (H)                      73 - 393 U/L               -METABOLIC PANEL, COMPREHENSIVE       Result Value                         Ref Range                       Sodium                                            139                           136 - 145 mmol/L                Potassium                                         4.5                           3.5 - 5.1 mmol/L                Chloride                                          99                            98 - 107 mmol/L                 CO2                                               22                            21 - 32 mmol/L                  Anion gap                                         18 (H)                        7 - 16 mmol/L                   Glucose                                           144 (H)                       65 - 100 mg/dL                  BUN                                               91 (H)                        6 - 23 MG/DL                    Creatinine                                        6.21 (H)                      0.6 - 1.0 MG/DL                 GFR est AA                                        10 (L)                        >60 ml/min/1.73m2               GFR est non-AA                                    8 (L)                         >60 ml/min/1.73m2               Calcium                                           8.7                           8.3 - 10.4 MG/DL                Bilirubin, total                                  36.2 (H)                      0.2 - 1.1 MG/DL                 ALT (SGPT)                                        274 (H)                       12 - 65 U/L                     AST (SGOT)                                        378 (H)                       15 - 37 U/L                     Alk. phosphatase                                  PENDING                       U/L                             Protein, total                                    6.4                           6.3 - 8.2 g/dL                  Albumin                                           2.1 (L)                       3.5 - 5.0 g/dL                  Globulin                                          4.3 (H)                       2.3 - 3.5 g/dL                  A-G Ratio                                         0.5 (L)                       1.2 - 3.5                  -CBC WITH AUTOMATED DIFF       Result                                            Value                         Ref Range                       WBC                                               13.8 (H)                      4.3 - 11.1 K/uL                 RBC                                               3.80 (L)                      4.05 - 5.25 M/uL                HGB                                               11.0 (L)                      11.7 - 15.4 g/dL                HCT                                               30.6 (L)                      35.8 - 46.3 %                   MCV                                               80.5                          79.6 - 97.8 FL                  MCH                                               29.7                          26.1 - 32.9 PG                  MCHC                                              36.9 (H)                      31.4 - 35.0 g/dL                RDW                                               24.8 (H)                      11.9 - 14.6 %                   PLATELET                                          149 (L)                       150 - 450 K/uL                  MPV                                               Cannot be calulated           10.8 - 14.1 FL                  DF                                                AUTOMATED                                                     NEUTROPHILS                                       75                            43 - 78 %                       LYMPHOCYTES                                       13                            13 - 44 %                       MONOCYTES                                         10                            4.0 - 12.0 % EOSINOPHILS                                       1                             0.5 - 7.8 %                     BASOPHILS                                         0                             0.0 - 2.0 %                     IMMATURE GRANULOCYTES                             1.3                           0.0 - 5.0 %                     ABS. NEUTROPHILS                                  10.2 (H)                      1.7 - 8.2 K/UL                  ABS. LYMPHOCYTES                                  1.8                           0.5 - 4.6 K/UL                  ABS. MONOCYTES                                    1.4 (H)                       0.1 - 1.3 K/UL                  ABS. EOSINOPHILS                                  0.1                           0.0 - 0.8 K/UL                  ABS. BASOPHILS                                    0.1                           0.0 - 0.2 K/UL                  ABS. IMM.  GRANS.                                  0.2                           0.0 - 0.5 K/UL             -PROTHROMBIN TIME + INR       Result                                            Value                         Ref Range                       Prothrombin time                                  21.2 (H)                      9.6 - 12.0 sec                  INR                                               2.0 (H)                       0.9 - 1.2                  )  Tests in the radiology section of CPT®: ordered and reviewed      ED Course       Procedures

## 2017-05-30 LAB
BACTERIA SPEC CULT: NORMAL
BACTERIA SPEC CULT: NORMAL
SERVICE CMNT-IMP: NORMAL

## 2017-10-31 PROBLEM — K85.90 ACUTE PANCREATITIS: Status: RESOLVED | Noted: 2017-05-28 | Resolved: 2017-10-31

## 2017-10-31 PROBLEM — Z94.0 HISTORY OF KIDNEY TRANSPLANT: Status: ACTIVE | Noted: 2017-10-31

## 2017-10-31 PROBLEM — T50.905A HYPERGLYCEMIA, DRUG-INDUCED: Status: ACTIVE | Noted: 2017-10-31

## 2017-10-31 PROBLEM — N17.9 AKI (ACUTE KIDNEY INJURY) (HCC): Status: RESOLVED | Noted: 2017-05-28 | Resolved: 2017-10-31

## 2017-10-31 PROBLEM — R73.9 HYPERGLYCEMIA, DRUG-INDUCED: Status: ACTIVE | Noted: 2017-10-31

## 2018-11-06 ENCOUNTER — APPOINTMENT (OUTPATIENT)
Dept: GENERAL RADIOLOGY | Age: 38
DRG: 720 | End: 2018-11-06
Attending: STUDENT IN AN ORGANIZED HEALTH CARE EDUCATION/TRAINING PROGRAM
Payer: MEDICAID

## 2018-11-06 ENCOUNTER — HOSPITAL ENCOUNTER (INPATIENT)
Age: 38
LOS: 3 days | Discharge: HOME | DRG: 720 | End: 2018-11-10
Attending: STUDENT IN AN ORGANIZED HEALTH CARE EDUCATION/TRAINING PROGRAM | Admitting: HOSPITALIST
Payer: MEDICAID

## 2018-11-06 DIAGNOSIS — Z94.4 HISTORY OF LIVER TRANSPLANT (HCC): ICD-10-CM

## 2018-11-06 DIAGNOSIS — Z94.0 HISTORY OF KIDNEY TRANSPLANT: ICD-10-CM

## 2018-11-06 DIAGNOSIS — R50.9 FEVER, UNSPECIFIED FEVER CAUSE: Primary | ICD-10-CM

## 2018-11-06 DIAGNOSIS — R65.10 SIRS (SYSTEMIC INFLAMMATORY RESPONSE SYNDROME) (HCC): ICD-10-CM

## 2018-11-06 LAB
ALBUMIN SERPL-MCNC: 3.1 G/DL (ref 3.5–5)
ALBUMIN/GLOB SERPL: 0.7 {RATIO} (ref 1.2–3.5)
ALP SERPL-CCNC: 77 U/L (ref 50–136)
ALT SERPL-CCNC: 35 U/L (ref 12–65)
AMMONIA PLAS-SCNC: 48 UMOL/L (ref 11–32)
ANION GAP SERPL CALC-SCNC: 12 MMOL/L (ref 7–16)
AST SERPL-CCNC: 27 U/L (ref 15–37)
BASOPHILS # BLD: 0 K/UL (ref 0–0.2)
BASOPHILS NFR BLD: 0 % (ref 0–2)
BILIRUB SERPL-MCNC: 0.8 MG/DL (ref 0.2–1.1)
BUN SERPL-MCNC: 19 MG/DL (ref 6–23)
CALCIUM SERPL-MCNC: 7.9 MG/DL (ref 8.3–10.4)
CHLORIDE SERPL-SCNC: 101 MMOL/L (ref 98–107)
CO2 SERPL-SCNC: 21 MMOL/L (ref 21–32)
CREAT SERPL-MCNC: 1.86 MG/DL (ref 0.6–1)
DIFFERENTIAL METHOD BLD: ABNORMAL
EOSINOPHIL # BLD: 0 K/UL (ref 0–0.8)
EOSINOPHIL NFR BLD: 0 % (ref 0.5–7.8)
ERYTHROCYTE [DISTWIDTH] IN BLOOD BY AUTOMATED COUNT: 13.2 %
GLOBULIN SER CALC-MCNC: 4.4 G/DL (ref 2.3–3.5)
GLUCOSE SERPL-MCNC: 166 MG/DL (ref 65–100)
HCT VFR BLD AUTO: 33 % (ref 35.8–46.3)
HGB BLD-MCNC: 11 G/DL (ref 11.7–15.4)
IMM GRANULOCYTES # BLD: 0.1 K/UL (ref 0–0.5)
IMM GRANULOCYTES NFR BLD AUTO: 1 % (ref 0–5)
INR BLD: 1.4 (ref 0.9–1.2)
LACTATE BLD-SCNC: 0.99 MMOL/L (ref 0.5–1.9)
LYMPHOCYTES # BLD: 0.9 K/UL (ref 0.5–4.6)
LYMPHOCYTES NFR BLD: 8 % (ref 13–44)
MCH RBC QN AUTO: 30.2 PG (ref 26.1–32.9)
MCHC RBC AUTO-ENTMCNC: 33.3 G/DL (ref 31.4–35)
MCV RBC AUTO: 90.7 FL (ref 79.6–97.8)
MONOCYTES # BLD: 1.1 K/UL (ref 0.1–1.3)
MONOCYTES NFR BLD: 10 % (ref 4–12)
NEUTS SEG # BLD: 8.8 K/UL (ref 1.7–8.2)
NEUTS SEG NFR BLD: 81 % (ref 43–78)
NRBC # BLD: 0 K/UL (ref 0–0.2)
PLATELET # BLD AUTO: 141 K/UL (ref 150–450)
PMV BLD AUTO: 12.1 FL (ref 9.4–12.3)
POTASSIUM SERPL-SCNC: 3.6 MMOL/L (ref 3.5–5.1)
PROCALCITONIN SERPL-MCNC: 11 NG/ML
PROT SERPL-MCNC: 7.5 G/DL (ref 6.3–8.2)
PT BLD: 16.5 SECS (ref 9.6–11.6)
RBC # BLD AUTO: 3.64 M/UL (ref 4.05–5.2)
SODIUM SERPL-SCNC: 134 MMOL/L (ref 136–145)
WBC # BLD AUTO: 10.9 K/UL (ref 4.3–11.1)

## 2018-11-06 PROCEDURE — 87077 CULTURE AEROBIC IDENTIFY: CPT

## 2018-11-06 PROCEDURE — 83605 ASSAY OF LACTIC ACID: CPT

## 2018-11-06 PROCEDURE — 93005 ELECTROCARDIOGRAM TRACING: CPT | Performed by: EMERGENCY MEDICINE

## 2018-11-06 PROCEDURE — 85025 COMPLETE CBC W/AUTO DIFF WBC: CPT

## 2018-11-06 PROCEDURE — 99285 EMERGENCY DEPT VISIT HI MDM: CPT | Performed by: STUDENT IN AN ORGANIZED HEALTH CARE EDUCATION/TRAINING PROGRAM

## 2018-11-06 PROCEDURE — 87186 SC STD MICRODIL/AGAR DIL: CPT

## 2018-11-06 PROCEDURE — 87205 SMEAR GRAM STAIN: CPT

## 2018-11-06 PROCEDURE — 74011250637 HC RX REV CODE- 250/637: Performed by: STUDENT IN AN ORGANIZED HEALTH CARE EDUCATION/TRAINING PROGRAM

## 2018-11-06 PROCEDURE — 85610 PROTHROMBIN TIME: CPT

## 2018-11-06 PROCEDURE — 83690 ASSAY OF LIPASE: CPT

## 2018-11-06 PROCEDURE — 87804 INFLUENZA ASSAY W/OPTIC: CPT

## 2018-11-06 PROCEDURE — 74011000258 HC RX REV CODE- 258: Performed by: STUDENT IN AN ORGANIZED HEALTH CARE EDUCATION/TRAINING PROGRAM

## 2018-11-06 PROCEDURE — 96365 THER/PROPH/DIAG IV INF INIT: CPT | Performed by: STUDENT IN AN ORGANIZED HEALTH CARE EDUCATION/TRAINING PROGRAM

## 2018-11-06 PROCEDURE — 74011250636 HC RX REV CODE- 250/636: Performed by: STUDENT IN AN ORGANIZED HEALTH CARE EDUCATION/TRAINING PROGRAM

## 2018-11-06 PROCEDURE — 71046 X-RAY EXAM CHEST 2 VIEWS: CPT

## 2018-11-06 PROCEDURE — 80053 COMPREHEN METABOLIC PANEL: CPT

## 2018-11-06 PROCEDURE — 84145 PROCALCITONIN (PCT): CPT

## 2018-11-06 PROCEDURE — 87040 BLOOD CULTURE FOR BACTERIA: CPT

## 2018-11-06 PROCEDURE — 82140 ASSAY OF AMMONIA: CPT

## 2018-11-06 RX ORDER — VANCOMYCIN 1.75 GRAM/500 ML IN 0.9 % SODIUM CHLORIDE INTRAVENOUS
1750 ONCE
Status: COMPLETED | OUTPATIENT
Start: 2018-11-06 | End: 2018-11-07

## 2018-11-06 RX ORDER — ONDANSETRON 2 MG/ML
4 INJECTION INTRAMUSCULAR; INTRAVENOUS
Status: COMPLETED | OUTPATIENT
Start: 2018-11-06 | End: 2018-11-07

## 2018-11-06 RX ORDER — ACETAMINOPHEN 500 MG
1000 TABLET ORAL
Status: COMPLETED | OUTPATIENT
Start: 2018-11-06 | End: 2018-11-06

## 2018-11-06 RX ADMIN — ACETAMINOPHEN 1000 MG: 500 TABLET, FILM COATED ORAL at 22:59

## 2018-11-06 RX ADMIN — PIPERACILLIN SODIUM,TAZOBACTAM SODIUM 4.5 G: 4; .5 INJECTION, POWDER, FOR SOLUTION INTRAVENOUS at 23:41

## 2018-11-06 RX ADMIN — SODIUM CHLORIDE 1000 ML: 900 INJECTION, SOLUTION INTRAVENOUS at 23:00

## 2018-11-07 ENCOUNTER — APPOINTMENT (OUTPATIENT)
Dept: ULTRASOUND IMAGING | Age: 38
DRG: 720 | End: 2018-11-07
Attending: INTERNAL MEDICINE
Payer: MEDICAID

## 2018-11-07 PROBLEM — A41.9 SEPSIS DUE TO UNDETERMINED ORGANISM, WITH ACUTE RENAL FAILURE (HCC): Status: ACTIVE | Noted: 2018-11-07

## 2018-11-07 PROBLEM — N17.9 SEPSIS DUE TO UNDETERMINED ORGANISM, WITH ACUTE RENAL FAILURE (HCC): Status: ACTIVE | Noted: 2018-11-07

## 2018-11-07 PROBLEM — R50.9 FEVER: Status: ACTIVE | Noted: 2018-11-07

## 2018-11-07 PROBLEM — I10 HTN (HYPERTENSION): Status: ACTIVE | Noted: 2018-11-07

## 2018-11-07 PROBLEM — R65.20 SEPSIS DUE TO UNDETERMINED ORGANISM, WITH ACUTE RENAL FAILURE (HCC): Status: ACTIVE | Noted: 2018-11-07

## 2018-11-07 LAB
ALBUMIN SERPL-MCNC: 2.7 G/DL (ref 3.5–5)
ALBUMIN/GLOB SERPL: 0.7 {RATIO} (ref 1.2–3.5)
ALP SERPL-CCNC: 71 U/L (ref 50–136)
ALT SERPL-CCNC: 31 U/L (ref 12–65)
ANION GAP SERPL CALC-SCNC: 11 MMOL/L (ref 7–16)
ANION GAP SERPL CALC-SCNC: 12 MMOL/L (ref 7–16)
ANION GAP SERPL CALC-SCNC: 9 MMOL/L (ref 7–16)
APPEARANCE UR: ABNORMAL
AST SERPL-CCNC: 20 U/L (ref 15–37)
ATRIAL RATE: 111 BPM
BACTERIA URNS QL MICRO: ABNORMAL /HPF
BASOPHILS # BLD: 0 K/UL (ref 0–0.2)
BASOPHILS NFR BLD: 0 % (ref 0–2)
BILIRUB SERPL-MCNC: 0.8 MG/DL (ref 0.2–1.1)
BILIRUB UR QL: NEGATIVE
BUN SERPL-MCNC: 19 MG/DL (ref 6–23)
BUN SERPL-MCNC: 19 MG/DL (ref 6–23)
BUN SERPL-MCNC: 20 MG/DL (ref 6–23)
CALCIUM SERPL-MCNC: 7.3 MG/DL (ref 8.3–10.4)
CALCIUM SERPL-MCNC: 7.3 MG/DL (ref 8.3–10.4)
CALCIUM SERPL-MCNC: 7.4 MG/DL (ref 8.3–10.4)
CALCULATED P AXIS, ECG09: 59 DEGREES
CALCULATED R AXIS, ECG10: 53 DEGREES
CALCULATED T AXIS, ECG11: 46 DEGREES
CHLORIDE SERPL-SCNC: 106 MMOL/L (ref 98–107)
CHLORIDE SERPL-SCNC: 107 MMOL/L (ref 98–107)
CHLORIDE SERPL-SCNC: 110 MMOL/L (ref 98–107)
CK SERPL-CCNC: 123 U/L (ref 21–215)
CO2 SERPL-SCNC: 20 MMOL/L (ref 21–32)
CO2 SERPL-SCNC: 21 MMOL/L (ref 21–32)
CO2 SERPL-SCNC: 22 MMOL/L (ref 21–32)
COLOR UR: YELLOW
CREAT SERPL-MCNC: 1.85 MG/DL (ref 0.6–1)
CREAT SERPL-MCNC: 1.9 MG/DL (ref 0.6–1)
CREAT SERPL-MCNC: 1.94 MG/DL (ref 0.6–1)
CRP SERPL-MCNC: 24.7 MG/DL (ref 0–0.9)
DIAGNOSIS, 93000: NORMAL
DIFFERENTIAL METHOD BLD: ABNORMAL
EOSINOPHIL # BLD: 0 K/UL (ref 0–0.8)
EOSINOPHIL #/AREA URNS HPF: NEGATIVE /[HPF]
EOSINOPHIL NFR BLD: 0 % (ref 0.5–7.8)
EPI CELLS #/AREA URNS HPF: ABNORMAL /HPF
ERYTHROCYTE [DISTWIDTH] IN BLOOD BY AUTOMATED COUNT: 12.9 %
ERYTHROCYTE [SEDIMENTATION RATE] IN BLOOD: 94 MM/HR (ref 0–20)
FLUAV AG NPH QL IA: NEGATIVE
FLUBV AG NPH QL IA: NEGATIVE
GLOBULIN SER CALC-MCNC: 4 G/DL (ref 2.3–3.5)
GLUCOSE SERPL-MCNC: 135 MG/DL (ref 65–100)
GLUCOSE SERPL-MCNC: 144 MG/DL (ref 65–100)
GLUCOSE SERPL-MCNC: 187 MG/DL (ref 65–100)
GLUCOSE UR STRIP.AUTO-MCNC: NEGATIVE MG/DL
HCT VFR BLD AUTO: 31 % (ref 35.8–46.3)
HGB BLD-MCNC: 10.5 G/DL (ref 11.7–15.4)
HGB BLD-MCNC: 9.8 G/DL (ref 11.7–15.4)
HGB UR QL STRIP: ABNORMAL
IMM GRANULOCYTES # BLD: 0 K/UL (ref 0–0.5)
IMM GRANULOCYTES NFR BLD AUTO: 0 % (ref 0–5)
KETONES UR QL STRIP.AUTO: NEGATIVE MG/DL
LEUKOCYTE ESTERASE UR QL STRIP.AUTO: ABNORMAL
LIPASE SERPL-CCNC: 52 U/L (ref 73–393)
LYMPHOCYTES # BLD: 0.6 K/UL (ref 0.5–4.6)
LYMPHOCYTES NFR BLD: 8 % (ref 13–44)
MAGNESIUM SERPL-MCNC: 1.8 MG/DL (ref 1.8–2.4)
MCH RBC QN AUTO: 31.2 PG (ref 26.1–32.9)
MCHC RBC AUTO-ENTMCNC: 33.9 G/DL (ref 31.4–35)
MCV RBC AUTO: 92 FL (ref 79.6–97.8)
MONOCYTES # BLD: 1 K/UL (ref 0.1–1.3)
MONOCYTES NFR BLD: 13 % (ref 4–12)
NEUTS SEG # BLD: 5.8 K/UL (ref 1.7–8.2)
NEUTS SEG NFR BLD: 79 % (ref 43–78)
NITRITE UR QL STRIP.AUTO: NEGATIVE
NRBC # BLD: 0 K/UL (ref 0–0.2)
P-R INTERVAL, ECG05: 130 MS
PH UR STRIP: 6.5 [PH] (ref 5–9)
PHOSPHATE SERPL-MCNC: 1.4 MG/DL (ref 2.5–4.5)
PLATELET # BLD AUTO: 82 K/UL (ref 150–450)
PMV BLD AUTO: 12.4 FL (ref 9.4–12.3)
POTASSIUM SERPL-SCNC: 3.2 MMOL/L (ref 3.5–5.1)
POTASSIUM SERPL-SCNC: 3.3 MMOL/L (ref 3.5–5.1)
POTASSIUM SERPL-SCNC: 3.4 MMOL/L (ref 3.5–5.1)
PROT SERPL-MCNC: 6.7 G/DL (ref 6.3–8.2)
PROT UR STRIP-MCNC: 100 MG/DL
Q-T INTERVAL, ECG07: 298 MS
QRS DURATION, ECG06: 70 MS
QTC CALCULATION (BEZET), ECG08: 405 MS
RBC # BLD AUTO: 3.37 M/UL (ref 4.05–5.2)
RBC #/AREA URNS HPF: ABNORMAL /HPF
SODIUM SERPL-SCNC: 138 MMOL/L (ref 136–145)
SODIUM SERPL-SCNC: 139 MMOL/L (ref 136–145)
SODIUM SERPL-SCNC: 141 MMOL/L (ref 136–145)
SP GR UR REFRACTOMETRY: 1.01 (ref 1–1.02)
SPECIMEN SOURCE: NORMAL
UROBILINOGEN UR QL STRIP.AUTO: 1 EU/DL (ref 0.2–1)
VENTRICULAR RATE, ECG03: 111 BPM
WBC # BLD AUTO: 7.4 K/UL (ref 4.3–11.1)
WBC URNS QL MICRO: ABNORMAL /HPF
YEAST URNS QL MICRO: ABNORMAL

## 2018-11-07 PROCEDURE — 74011636637 HC RX REV CODE- 636/637: Performed by: HOSPITALIST

## 2018-11-07 PROCEDURE — 84100 ASSAY OF PHOSPHORUS: CPT

## 2018-11-07 PROCEDURE — 36415 COLL VENOUS BLD VENIPUNCTURE: CPT

## 2018-11-07 PROCEDURE — 96375 TX/PRO/DX INJ NEW DRUG ADDON: CPT | Performed by: STUDENT IN AN ORGANIZED HEALTH CARE EDUCATION/TRAINING PROGRAM

## 2018-11-07 PROCEDURE — 83735 ASSAY OF MAGNESIUM: CPT

## 2018-11-07 PROCEDURE — 80053 COMPREHEN METABOLIC PANEL: CPT

## 2018-11-07 PROCEDURE — 74011250636 HC RX REV CODE- 250/636: Performed by: INTERNAL MEDICINE

## 2018-11-07 PROCEDURE — 74011250636 HC RX REV CODE- 250/636: Performed by: HOSPITALIST

## 2018-11-07 PROCEDURE — 87205 SMEAR GRAM STAIN: CPT

## 2018-11-07 PROCEDURE — 74011250637 HC RX REV CODE- 250/637: Performed by: FAMILY MEDICINE

## 2018-11-07 PROCEDURE — 87086 URINE CULTURE/COLONY COUNT: CPT

## 2018-11-07 PROCEDURE — 85018 HEMOGLOBIN: CPT

## 2018-11-07 PROCEDURE — 74011250636 HC RX REV CODE- 250/636: Performed by: STUDENT IN AN ORGANIZED HEALTH CARE EDUCATION/TRAINING PROGRAM

## 2018-11-07 PROCEDURE — 76776 US EXAM K TRANSPL W/DOPPLER: CPT

## 2018-11-07 PROCEDURE — 86140 C-REACTIVE PROTEIN: CPT

## 2018-11-07 PROCEDURE — 74011250637 HC RX REV CODE- 250/637: Performed by: HOSPITALIST

## 2018-11-07 PROCEDURE — 81001 URINALYSIS AUTO W/SCOPE: CPT

## 2018-11-07 PROCEDURE — 85025 COMPLETE CBC W/AUTO DIFF WBC: CPT

## 2018-11-07 PROCEDURE — 82550 ASSAY OF CK (CPK): CPT

## 2018-11-07 PROCEDURE — 85652 RBC SED RATE AUTOMATED: CPT

## 2018-11-07 PROCEDURE — 86710 INFLUENZA VIRUS ANTIBODY: CPT

## 2018-11-07 PROCEDURE — 77030020263 HC SOL INJ SOD CL0.9% LFCR 1000ML

## 2018-11-07 PROCEDURE — 80048 BASIC METABOLIC PNL TOTAL CA: CPT

## 2018-11-07 PROCEDURE — 88185 FLOWCYTOMETRY/TC ADD-ON: CPT

## 2018-11-07 PROCEDURE — 74011000258 HC RX REV CODE- 258: Performed by: HOSPITALIST

## 2018-11-07 PROCEDURE — 65660000000 HC RM CCU STEPDOWN

## 2018-11-07 RX ORDER — PREDNISONE 10 MG/1
10 TABLET ORAL
Status: DISCONTINUED | OUTPATIENT
Start: 2018-11-07 | End: 2018-11-07

## 2018-11-07 RX ORDER — ONDANSETRON 2 MG/ML
4 INJECTION INTRAMUSCULAR; INTRAVENOUS
Status: DISCONTINUED | OUTPATIENT
Start: 2018-11-07 | End: 2018-11-10 | Stop reason: HOSPADM

## 2018-11-07 RX ORDER — SODIUM CHLORIDE 0.9 % (FLUSH) 0.9 %
5-10 SYRINGE (ML) INJECTION EVERY 8 HOURS
Status: DISCONTINUED | OUTPATIENT
Start: 2018-11-07 | End: 2018-11-10 | Stop reason: HOSPADM

## 2018-11-07 RX ORDER — TACROLIMUS 1 MG/1
1 CAPSULE ORAL EVERY 12 HOURS
Status: DISCONTINUED | OUTPATIENT
Start: 2018-11-07 | End: 2018-11-07 | Stop reason: SDUPTHER

## 2018-11-07 RX ORDER — SODIUM CHLORIDE 9 MG/ML
100 INJECTION, SOLUTION INTRAVENOUS CONTINUOUS
Status: DISCONTINUED | OUTPATIENT
Start: 2018-11-07 | End: 2018-11-09 | Stop reason: ALTCHOICE

## 2018-11-07 RX ORDER — MYCOPHENOLATE MOFETIL 500 MG/1
1000 TABLET ORAL
Status: DISCONTINUED | OUTPATIENT
Start: 2018-11-07 | End: 2018-11-08

## 2018-11-07 RX ORDER — SODIUM CHLORIDE 0.9 % (FLUSH) 0.9 %
5-10 SYRINGE (ML) INJECTION AS NEEDED
Status: DISCONTINUED | OUTPATIENT
Start: 2018-11-07 | End: 2018-11-10 | Stop reason: HOSPADM

## 2018-11-07 RX ORDER — TACROLIMUS 1 MG/1
6 CAPSULE ORAL 2 TIMES DAILY
Status: DISCONTINUED | OUTPATIENT
Start: 2018-11-07 | End: 2018-11-10 | Stop reason: HOSPADM

## 2018-11-07 RX ORDER — TACROLIMUS 1 MG/1
1 CAPSULE ORAL 2 TIMES DAILY
Status: DISCONTINUED | OUTPATIENT
Start: 2018-11-07 | End: 2018-11-07

## 2018-11-07 RX ORDER — AMLODIPINE BESYLATE 10 MG/1
10 TABLET ORAL DAILY
Status: DISCONTINUED | OUTPATIENT
Start: 2018-11-07 | End: 2018-11-10 | Stop reason: HOSPADM

## 2018-11-07 RX ORDER — ACETAMINOPHEN 325 MG/1
650 TABLET ORAL
Status: DISCONTINUED | OUTPATIENT
Start: 2018-11-07 | End: 2018-11-10 | Stop reason: HOSPADM

## 2018-11-07 RX ADMIN — PREDNISONE 10 MG: 10 TABLET ORAL at 09:28

## 2018-11-07 RX ADMIN — PIPERACILLIN SODIUM,TAZOBACTAM SODIUM 4.5 G: 4; .5 INJECTION, POWDER, FOR SOLUTION INTRAVENOUS at 17:25

## 2018-11-07 RX ADMIN — Medication 10 ML: at 03:00

## 2018-11-07 RX ADMIN — PIPERACILLIN SODIUM,TAZOBACTAM SODIUM 3.38 G: 3; .375 INJECTION, POWDER, FOR SOLUTION INTRAVENOUS at 03:01

## 2018-11-07 RX ADMIN — AMLODIPINE BESYLATE 10 MG: 10 TABLET ORAL at 09:28

## 2018-11-07 RX ADMIN — VANCOMYCIN HYDROCHLORIDE 1750 MG: 10 INJECTION, POWDER, LYOPHILIZED, FOR SOLUTION INTRAVENOUS at 00:17

## 2018-11-07 RX ADMIN — MYCOPHENOLATE MOFETIL 1000 MG: 500 TABLET ORAL at 17:22

## 2018-11-07 RX ADMIN — TACROLIMUS 1 MG: 1 CAPSULE ORAL at 09:29

## 2018-11-07 RX ADMIN — Medication 5 ML: at 22:37

## 2018-11-07 RX ADMIN — ONDANSETRON 4 MG: 2 INJECTION INTRAMUSCULAR; INTRAVENOUS at 04:23

## 2018-11-07 RX ADMIN — Medication 10 ML: at 03:01

## 2018-11-07 RX ADMIN — ONDANSETRON 4 MG: 2 INJECTION, SOLUTION INTRAMUSCULAR; INTRAVENOUS at 00:07

## 2018-11-07 RX ADMIN — ONDANSETRON 4 MG: 2 INJECTION INTRAMUSCULAR; INTRAVENOUS at 10:35

## 2018-11-07 RX ADMIN — ACETAMINOPHEN 650 MG: 325 TABLET, FILM COATED ORAL at 12:28

## 2018-11-07 RX ADMIN — SODIUM CHLORIDE 150 ML/HR: 900 INJECTION, SOLUTION INTRAVENOUS at 17:26

## 2018-11-07 RX ADMIN — TACROLIMUS 6 MG: 1 CAPSULE ORAL at 17:23

## 2018-11-07 RX ADMIN — PIPERACILLIN SODIUM,TAZOBACTAM SODIUM 3.38 G: 3; .375 INJECTION, POWDER, FOR SOLUTION INTRAVENOUS at 09:29

## 2018-11-07 RX ADMIN — Medication 5 ML: at 15:40

## 2018-11-07 RX ADMIN — SODIUM CHLORIDE 150 ML/HR: 900 INJECTION, SOLUTION INTRAVENOUS at 03:04

## 2018-11-07 NOTE — PROGRESS NOTES
Hospitalist Follow Up  
 
2018 NAME: Rosendo Aviles :  1980 MRN:  [id number] Attending: Berna Fowler MD 
PCP:  Kaylee Mercedes MD 
Treatment Team: Attending Provider: Berna Fowler MD; Consulting Provider: Darya Gunderson MD 
 
Patient admitted after midnight. See H&P from this AM.  Patient continues to spike fevers, most recent temp of 103.2. Patient feels \"miserable. \"  Continues to have severe nausea and dry heaves. Complains of diffuse myalgias. Denies dysuria. Currently on period. Visit Vitals /77 (BP 1 Location: Right arm, BP Patient Position: At rest) Pulse 98 Temp (!) 103.2 °F (39.6 °C) Resp 18 Ht 5' 4\" (1.626 m) Wt 79.2 kg (174 lb 8 oz) SpO2 100% BMI 29.95 kg/m² Active Hospital Problems Diagnosis Date Noted  Sepsis due to undetermined organism, with acute renal failure ([facility] 2018  
 HTN (hypertension) 2018  Fever 2018  History of kidney transplant 10/31/2017  Autoimmune hepatitis ([facility] 2017  History of liver transplant ([facility] 2017 Plan: 
- Plan per admission H&P. 
- [facility] transplant service has accepted patient, but no bed currently available there. Will transfer patient as soon as bed becomes available given possibility of transplant rejection. 
- Continue IV zosyn. 
- Pt reports being okay'd for PO tylenol in the past.  Given very high temps and associated symptoms, will start PO tylenol 650mg q6h. Monitor LFTs closely. Transaminases normal currently. - Reports good relief of symptoms with phenergan at [place] [id number] [place]. Will start IV phenergan given intractable N/V. Signed By: Mary Aguila MD   
 2018

## 2018-11-07 NOTE — PROGRESS NOTES
Pt resting in bed with HOB elevated. Pt is stable with even and unlabored respirations. Pt c/o nausea. Pt is axox3. IV site clean dry and intact. NS running at 150mL/hr. Bed is in low and locked position. Call light within reach. Pt instructed to ask for assistance if needed. Will continue to monitor.

## 2018-11-07 NOTE — CONSULTS
[facility] CONSULT NOTE    Admission Date:  11/6/2018    Admission Diagnosis:  Sepsis due to undetermined organism, with acute renal failure [facility])    Consulting physician: Clarisse Haji MD    Reason for consult: MICHAEL with renal transplant    Subjective:   History of Present Illness: John Aviles is a pleasant 43year old  female with history of autoimmune hepatitis, liver failure s/p liver transplant 2002 with subsequent transplant failure and renal failure and re-do liver and simultaneous kidney transplant on 7/18/17 at [address]. Donor CMV+/Recipient CMV+. She was taking tacrolimus 6 mg bid, Cellcept 1000 mg bid, and prednisone 5mg daily until recently when she lost her insurance and was unable to afford her medications. She has been out of all of her medications for about a week. She presented to the ED with fevers, chills, malaise, and nausea with dry heaves of about 4 days duration. Creatinine at baseline was 0.83-0.95 at [address] with last labs in July 2018. Tacrolimus level in July 2018 was 10.5. Creatinine on arrival was 1.86 and up to 1.94 this morning. She has had no rash, no sores, cellulitis. She denies cough, sob, cp, diarrhea, abdominal pain, dysguesia, anorexia, or edema. She has had no tenderness over the right lower quadrant renal transplant. [address] has accepted the patient in transfer but there are no beds available. She has been restarted on her tacrolimus but not the previous home dose. She has also been started on prednisone 10 mg daily. Her Cellcept has been held. She had fever to 103.1 on admission and still has fever 103.2 today. White blood cell count is not elevated. She has had blood cultures and urine cultures. Both no growth but <24 hours. Rapid flu test is negative as well. She has large blood and 100 protein on urinalysis but she is on her menstrual cycle. INR is 1.4. Platelets have dropped from 141K to 82K since admission. Hemoglobin is stable.  CRP and sed rate are elevated. Liver function studies are normal. CXR is normal. Nephrology is consulted for MICHAEL with renal transplant. Past Medical History:   Diagnosis Date    Diabetes ([address]     Infectious disease     hepatitis c    Liver disease     transplant , liver and kidney transplant 2017      Past Surgical History:   Procedure Laterality Date    HX  SECTION      HX HERNIA REPAIR      HX LIVER TRANSPLANT      10/7/02 and 17    HX TRANSPLANT      liver      Current Facility-Administered Medications   Medication Dose Route Frequency    tacrolimus (PROGRAF) capsule 1 mg  1 mg Oral BID    amLODIPine (NORVASC) tablet 10 mg  10 mg Oral DAILY    sodium chloride (NS) flush 5-10 mL  5-10 mL IntraVENous Q8H    sodium chloride (NS) flush 5-10 mL  5-10 mL IntraVENous PRN    0.9% sodium chloride infusion  150 mL/hr IntraVENous CONTINUOUS    ondansetron (ZOFRAN) injection 4 mg  4 mg IntraVENous Q4H PRN    piperacillin-tazobactam (ZOSYN) 3.375 g in 0.9% sodium chloride (MBP/ADV) 100 mL  3.375 g IntraVENous Q8H    promethazine (PHENERGAN) with saline injection 25 mg  25 mg IntraVENous Q4H PRN    acetaminophen (TYLENOL) tablet 650 mg  650 mg Oral Q6H PRN    [START ON 2018] methylPREDNISolone (PF) (SOLU-MEDROL) injection 125 mg  125 mg IntraVENous DAILY     No Known Allergies   Social History     Tobacco Use    Smoking status: Never Smoker    Smokeless tobacco: Never Used   Substance Use Topics    Alcohol use: No      Family History   Problem Relation Age of Onset    Cancer Mother     Hypertension Father     Stroke Father     Hypertension Sister         Review of Systems  Gen - + fever, + chills, appetite poor  HEENT - no sore throat, no decreased vision, no hearing loss  Neck - no neck mass  CV - no chest pain, no palpitation, no orthopnea  Lung - no shortness of breath, no cough, no hemoptysis  Abd - no tenderness, + nausea, +vomiting, no bloody stool.  No tenderness over renal transplant  Ext - no edema, no clubbing, no cyanosis  Musculoskeletal - no joint pain, no back pain  Neurologic - no headaches, no dizziness, no seizures  Psychiatric - no anxiety, no depression  Skin - no rashes, no pupura  Genitourinary - no decreased urine output, no hematuria, no foamy urine    Objective:   Vitals:    11/07/18 0126 11/07/18 0525 11/07/18 0721 11/07/18 1107   BP: 118/78  107/69 114/77   Pulse: 99  100 98   Resp: 18 18 18   Temp: 99 °F (37.2 °C)  (!) 101.5 °F (38.6 °C) (!) 103.2 °F (39.6 °C)   SpO2: 100%  97% 100%   Weight:  79.2 kg (174 lb 8 oz)     Height:           Intake/Output Summary (Last 24 hours) at 11/7/2018 1223  Last data filed at 11/7/2018 0929  Gross per 24 hour   Intake 360 ml   Output 800 ml   Net -440 ml       Physical Exam  GEN :in no distress, alert and oriented  HEENT: anicteric sclerae, eomi. Oropharynx without lesions. Mucous membranes are moist.  Neck - supple without JVD, no thyromegaly. No lymphadenopathy. CV - regular rate and rhythm, no murmur, no rub  Lung - clear bilaterally, lungs expand symmetrically  Chest wall - normal appearance  Abd - soft, nontender, bowel sounds present, no hepatosplenomegaly, no tenderness over RLQ renal transplant, non-boggy  Ext - no clubbing, no cyanosis, no edema  Neurologic - nonfocal  Genitourinary - bladder nonpalpable  Skin - no rashes, no purpura, no ecchymoses  Psychiatric: Normal mood and affect. Data Review:   Recent Labs     11/07/18  0516 11/06/18 2238   WBC 7.4 10.9   HGB 10.5* 11.0*   HCT 31.0* 33.0*   PLT 82* 141*     Recent Labs     11/07/18  0516 11/06/18 2238    134*   K 3.2* 3.6    101   CO2 20* 21   BUN 20 19   CREA 1.94* 1.86*   * 166*   CA 7.3* 7.9*   MG 1.8  --    PHOS 1.4*  --      No results for input(s): PH, PCO2, PO2, PCO2 in the last 72 hours.     Problem List:     Patient Active Problem List    Diagnosis Date Noted    Sepsis due to undetermined organism, with acute renal failure ([facility] 11/07/2018    HTN (hypertension) 11/07/2018    Fever 11/07/2018    History of kidney transplant 10/31/2017    Hyperglycemia, drug-induced 10/31/2017    Autoimmune hepatitis ([facility] 05/28/2017    History of liver transplant ([facility] 05/28/2017       Assessment and Plan:  MICHAEL - renal transplant rejection vs pre-renal azotemia with volume depletion  - baseline transplant creatinine is 0.8-0.9 mg/dL  - concern is for renal transplant rejection since she has been without all of her immunosuppressants for 1 week  - Will resume immunosuppressants at previous home doses with the exception of prednisone. Will start solumedrol 125 mg daily for 3 days and taper back to oral steroids. Benefit outweighs risk of infection. Fevers and nausea can be coming from acute rejection as well. No overt evidence that she has sepsis at this point with normal WBC and cultures negative so far. No hypotension and she does not appear acutely ill despite fevers  - check BK virus, flu  - follow bmp  - continue IVF  - agree with transfer to [address] when bed available    Liver and renal transplant 07/2017  - LFT's normal  - MICHAEL as above. Resume immunosuppressants as above + increase steroids  - check BK virus    Thrombocytopenia  - follow closely. ? Rejection vs dilutional from IVF    Fevers  - rapid flu negative. Can be falsely negative. Check Flu IgG, IgM  - no clear source of infection at this point.  See above        Ayana Campos, DIONNEP

## 2018-11-07 NOTE — ED TRIAGE NOTES
PT sts \"I'm a liver- kidney recipient and I ran out of my meds. I've been out for about a week now and I'm worried my organs are rejecting\" PT is febrile at 103.1 in triage and tachycardic.

## 2018-11-07 NOTE — PROGRESS NOTES
End of shift report given to [employer]. Patient resting in bed without complaints at present. No further complaints of nausea. Receiving IV antibiotics Zosyn and Vancomycin. Call light in reach.

## 2018-11-07 NOTE — ED PROVIDER NOTES
65-year-old female patient with a history of liver and kidney transplant presents with reports of fever, diffuse body aches, nausea and dry heaving. Patient states her symptoms started 2 days ago. Of note, patient has been out of her anti-rejection medication for approximately 1 week secondary to a lapse in insurance coverage. She is  Felt feverish at home but has not measured a fever thus far. She reports diffuse body aches intermittently but denies any specific pain at this time. She reports no chest pain, shortness of breath, abdominal pain. She denies changes in bowel or bladder habits. Her transplants  Were performed secondary to liver and kidney failure related to autoimmune disease. She is followed by [facility]'s transplant service aand has been in contact with this department over the past month to [facility] coverage and obtain refills of her medications but is felt to do so thus far. Denies known sick contacts, recent travel. He is not received a flu shot. Denies congestion, cough. The history is provided by the patient and a significant other. Fever This is a new problem. The current episode started 2 days ago. The problem occurs constantly. The problem has not changed since onset. Patient reports a subjective fever - was not measured. Pertinent negatives include no chest pain, no diarrhea, no vomiting, no congestion, no headaches, no sore throat, no cough, no shortness of breath, no neck pain and no rash. Past Medical History:  
Diagnosis Date  Diabetes ([address]  Infectious disease   
 hepatitis c  
 Liver disease   
 transplant , liver and kidney transplant 2017 Past Surgical History:  
Procedure Laterality Date  HX  SECTION    
 HX HERNIA REPAIR    
 HX LIVER TRANSPLANT    
 10/7/02 and 17  HX TRANSPLANT    
 liver Family History:  
Problem Relation Age of Onset  Cancer Mother  Hypertension Father  Stroke Father  Hypertension Sister Social History Socioeconomic History  Marital status: SINGLE Spouse name: Not on file  Number of children: Not on file  Years of education: Not on file  Highest education level: Not on file Social Needs  Financial resource strain: Not on file  Food insecurity - worry: Not on file  Food insecurity - inability: Not on file  Transportation needs - medical: Not on file  Transportation needs - non-medical: Not on file Occupational History  Not on file Tobacco Use  Smoking status: Never Smoker  Smokeless tobacco: Never Used Substance and Sexual Activity  Alcohol use: No  
 Drug use: No  
 Sexual activity: Yes  
  Partners: Male Birth control/protection: None Other Topics Concern  Not on file Social History Narrative  Not on file ALLERGIES: Patient has no known allergies. Review of Systems Constitutional: Positive for fever. Negative for chills and diaphoresis. HENT: Negative for congestion, sneezing and sore throat. Eyes: Negative for visual disturbance. Respiratory: Negative for cough, chest tightness, shortness of breath and wheezing. Cardiovascular: Negative for chest pain and leg swelling. Gastrointestinal: Negative for abdominal pain, blood in stool, diarrhea, nausea and vomiting. Endocrine: Negative for polyuria. Genitourinary: Negative for difficulty urinating, dysuria, flank pain, hematuria and urgency. Musculoskeletal: Negative for back pain, myalgias, neck pain and neck stiffness. Skin: Negative for color change and rash. Neurological: Negative for dizziness, syncope, speech difficulty, weakness, light-headedness, numbness and headaches. Psychiatric/Behavioral: Negative for behavioral problems. All other systems reviewed and are negative. Vitals:  
 11/06/18 2221 11/06/18 2249 11/06/18 2306 BP: 121/76 161/81 (!) 140/92 Pulse: (!) 114 (!) 112 (!) 105 Resp: 18 15 (!) 56 Temp: (!) 103.1 °F (39.5 °C) SpO2: 95% 100% 100% Weight: 74.8 kg (165 lb) Height: 5' 4\" (1.626 m) Physical Exam  
Constitutional: She is oriented to person, place, and time. She appears well-developed and well-nourished. No distress. Overall well-appearing female patient, Alert and oriented to person place and time. No acute distress, speaks in clear, fluid sentences. HENT:  
Head: Normocephalic and atraumatic. Right Ear: External ear normal.  
Left Ear: External ear normal.  
Nose: Nose normal.  
Eyes: EOM are normal. Pupils are equal, round, and reactive to light. Neck: Normal range of motion. Cardiovascular: Normal rate, regular rhythm, normal heart sounds and intact distal pulses. Exam reveals no gallop and no friction rub. No murmur heard. Pulmonary/Chest: Effort normal and breath sounds normal. No respiratory distress. She has no wheezes. She has no rales. She exhibits no tenderness. Abdominal: Soft. She exhibits no distension and no mass. There is no tenderness. There is no rebound and no guarding. No hernia. Musculoskeletal: Normal range of motion. She exhibits no edema, tenderness or deformity. Neurological: She is alert and oriented to person, place, and time. No cranial nerve deficit. Skin: Skin is warm and dry. She is not diaphoretic. Nursing note and vitals reviewed. MDM Number of Diagnoses or Management Options Fever, unspecified fever cause: new and requires workup History of kidney transplant: new and requires workup History of liver transplant [facility]): new and requires workup SIRS (systemic inflammatory response syndrome) ([facility] [address]): new and requires workup Diagnosis management comments: Patient has a fever of 103 on arrival and is tachycardic. She meets her prescription. She has a history of immunocompromise secondary to her transplant.   We will proceed withIV fluid hydration, blood culture collection and broad-spectrum antibiotics. CBC shows no significant elevation in patient's white count, lactic acid within normal limits. Percussive tenderness significantly elevated at 11. Patient's creatinine is slightly elevated from previous testing through [address]. 
sspoke with transplant specialist, Dr. Kamryn Felipe with [address]. Recommends broad-spectrum antibiotics, continue fluid hydration and culture collection. Except transfer of patient however there are no beds available at this time. Patient has been placed on the transfer list will be notified when that becomes available. In the meantime I spoken with our hospitalist who agrees to admit patient for further workup and care. Amount and/or Complexity of Data Reviewed Clinical lab tests: ordered and reviewed Tests in the radiology section of CPT®: ordered and reviewed Tests in the medicine section of CPT®: ordered and reviewed Discuss the patient with other providers: yes Independent visualization of images, tracings, or specimens: yes Risk of Complications, Morbidity, and/or Mortality Presenting problems: high Diagnostic procedures: high Management options: high Patient Progress Patient progress: stable Procedures

## 2018-11-07 NOTE — PROGRESS NOTES
Patient received to room from ED. Alert and orientated x 4. IV Vancomycin infusing. Patient denies pain. Attempting to collect urine specimen at this time.

## 2018-11-07 NOTE — H&P
Hospitalist H&P/Consult Note Admit Date:  2018 10:27 PM  
Name:  Mei Gonzalez Age:  43 y.o. 
:  1980 MRN:  [id number] PCP:  Nicole Harely MD 
Treatment Team: Attending Provider: Richie Russ MD 
 
HPI:  
Patient is a very pleasant 42 y/o female with hx of autoimmune hepatitis who underwent a liver transplant in . Later rejected this and had renal failure so underwent repeat liver transplant and a renal transplant 2017. She is being followed by her transplant specialist at [address]. She recently had a change in insurance coverage and when she went to get her rejection medications re-filled found out they were not covered and could not afford them. Her medication regimen includes prednisone, cellcept and prografSo she has been out of them for at least a week and is concern that she may be rejecting her organs. For the past couple days has had some malaise, myalgias, nausea and vomiting. Thought she may have an acute viral illness. Came to ED tonight when developed a fever. Initially tachycardic and tachypneic with a temp of 103. 1. Her Cr is increased to 1.86. She was cultured and started on broad-spectrum antibiotics. ED called the transplant center at [address] who agreed to accept patient in transfer however no baeds available tonight. Patient tested negative for influenza, chest xray is clear. Still awaiting a urine specimen. Will admit for further workup and treatment. 10 systems reviewed and negative except as noted in HPI. Past Medical History:  
Diagnosis Date  Diabetes ([address]  Infectious disease   
 hepatitis c  
 Liver disease   
 transplant , liver and kidney transplant 2017 Past Surgical History:  
Procedure Laterality Date  HX  SECTION    
 HX HERNIA REPAIR    
 HX LIVER TRANSPLANT    
 10/7/02 and 17  HX TRANSPLANT    
 liver Prior to Admission Medications Prescriptions Last Dose Informant Patient Reported? Taking? CELLCEPT 500 mg Tab 10/30/2018 at Unknown time  Yes Yes Sig: take 1,000 mg by mouth two (2) times a day. PROGRAF 1 mg Cap 10/30/2018 at Unknown time  Yes Yes Sig: Take 1 mg by mouth two (2) times a day. amLODIPine (NORVASC) 10 mg tablet 10/30/2018 at Unknown time  Yes Yes Sig: Take 10 mg by mouth daily. predniSONE (DELTASONE) 10 mg tablet 10/30/2018 at Unknown time  Yes Yes Sig: Take  by mouth daily (with breakfast). Facility-Administered Medications: None No Known Allergies Social History Tobacco Use  Smoking status: Never Smoker  Smokeless tobacco: Never Used Substance Use Topics  Alcohol use: No  
  
Family History Problem Relation Age of Onset  Cancer Mother  Hypertension Father  Stroke Father  Hypertension Sister Immunization History Administered Date(s) Administered  Influenza Vaccine 02/25/2010  TB Skin Test (PPD) 06/14/2017 Objective:  
 
Patient Vitals for the past 24 hrs: 
 Temp Pulse Resp BP SpO2  
11/07/18 0126 99 °F (37.2 °C) 99 18 118/78 100 % 11/07/18 0035 100.3 °F (37.9 °C) 91 18 107/73 100 % 11/07/18 0005  91  124/76 100 % 11/06/18 2306  (!) 105 (!) 56 (!) 140/92 100 % 11/06/18 2249  (!) 112 15 161/81 100 % 11/06/18 2221 (!) 103.1 °F (39.5 °C) (!) 114 18 121/76 95 % Oxygen Therapy O2 Sat (%): 100 % (11/07/18 0126) Pulse via Oximetry: 91 beats per minute (11/07/18 0005) O2 Device: Room air (11/06/18 2221) Intake/Output Summary (Last 24 hours) at 11/7/2018 5887 Last data filed at 11/7/2018 0203 Gross per 24 hour Intake  Output 800 ml Net -800 ml Physical Exam: 
General:    Well nourished. Alert. febrile Eyes:   Normal sclera. Extraocular movements intact. ENT:  Normocephalic, atraumatic. Moist mucous membranes CV:   tachycardic. No murmur, rub, or gallop. Lungs:  CTAB. No wheezing, rhonchi, or rales. Abdomen: Soft, nontender, nondistended.  Bowel sounds normal.  
 Extremities: Warm and dry. No cyanosis or edema. Neurologic: CN II-XII grossly intact. Sensation intact. Skin:     No rashes or jaundice. No wounds. Psych:  Normal mood and affect. I reviewed the labs, imaging, EKGs, telemetry, and other studies done this admission. Data Review:  
Recent Results (from the past 24 hour(s)) CBC WITH AUTOMATED DIFF Collection Time: 11/06/18 10:38 PM  
Result Value Ref Range WBC 10.9 4.3 - 11.1 K/uL  
 RBC 3.64 (L) 4.05 - 5.2 M/uL  
 HGB 11.0 (L) 11.7 - 15.4 g/dL HCT 33.0 (L) 35.8 - 46.3 % MCV 90.7 79.6 - 97.8 FL  
 MCH 30.2 26.1 - 32.9 PG  
 MCHC 33.3 31.4 - 35.0 g/dL  
 RDW 13.2 % PLATELET 812 (L) 875 - 450 K/uL MPV 12.1 9.4 - 12.3 FL ABSOLUTE NRBC 0.00 0.0 - 0.2 K/uL  
 DF AUTOMATED NEUTROPHILS 81 (H) 43 - 78 % LYMPHOCYTES 8 (L) 13 - 44 % MONOCYTES 10 4.0 - 12.0 % EOSINOPHILS 0 (L) 0.5 - 7.8 % BASOPHILS 0 0.0 - 2.0 % IMMATURE GRANULOCYTES 1 0.0 - 5.0 %  
 ABS. NEUTROPHILS 8.8 (H) 1.7 - 8.2 K/UL  
 ABS. LYMPHOCYTES 0.9 0.5 - 4.6 K/UL  
 ABS. MONOCYTES 1.1 0.1 - 1.3 K/UL  
 ABS. EOSINOPHILS 0.0 0.0 - 0.8 K/UL  
 ABS. BASOPHILS 0.0 0.0 - 0.2 K/UL  
 ABS. IMM. GRANS. 0.1 0.0 - 0.5 K/UL METABOLIC PANEL, COMPREHENSIVE Collection Time: 11/06/18 10:38 PM  
Result Value Ref Range Sodium 134 (L) 136 - 145 mmol/L Potassium 3.6 3.5 - 5.1 mmol/L Chloride 101 98 - 107 mmol/L  
 CO2 21 21 - 32 mmol/L Anion gap 12 7 - 16 mmol/L Glucose 166 (H) 65 - 100 mg/dL BUN 19 6 - 23 MG/DL Creatinine 1.86 (H) 0.6 - 1.0 MG/DL  
 GFR est AA 39 (L) >60 ml/min/1.73m2 GFR est non-AA 32 (L) >60 ml/min/1.73m2 Calcium 7.9 (L) 8.3 - 10.4 MG/DL Bilirubin, total 0.8 0.2 - 1.1 MG/DL  
 ALT (SGPT) 35 12 - 65 U/L  
 AST (SGOT) 27 15 - 37 U/L Alk. phosphatase 77 50 - 136 U/L Protein, total 7.5 6.3 - 8.2 g/dL Albumin 3.1 (L) 3.5 - 5.0 g/dL Globulin 4.4 (H) 2.3 - 3.5 g/dL A-G Ratio 0.7 (L) 1.2 - 3.5 PROCALCITONIN  
 Collection Time: 11/06/18 10:38 PM  
Result Value Ref Range Procalcitonin 11.0 ng/mL POC LACTIC ACID Collection Time: 11/06/18 10:42 PM  
Result Value Ref Range Lactic Acid (POC) 0.99 0.5 - 1.9 mmol/L  
AMMONIA Collection Time: 11/06/18 11:15 PM  
Result Value Ref Range Ammonia 48 (H) 11 - 32 UMOL/L  
LIPASE Collection Time: 11/06/18 11:15 PM  
Result Value Ref Range Lipase 52 (L) 73 - 393 U/L  
INFLUENZA A & B AG (RAPID TEST) Collection Time: 11/06/18 11:23 PM  
Result Value Ref Range Influenza A Ag NEGATIVE  NEG Influenza B Ag NEGATIVE  NEG Source NASOPHARYNGEAL    
POC PT/INR Collection Time: 11/06/18 11:31 PM  
Result Value Ref Range Prothrombin time (POC) 16.5 (H) 9.6 - 11.6 SECS  
 INR (POC) 1.4 (H) 0.9 - 1.2 Imaging Studies: CXR Results  (Last 48 hours) 11/06/18 2348  XR CHEST PA LAT Final result Impression:  IMPRESSION: Normal chest.  
   
   
   
   
  
 Narrative:  EXAM:  XR CHEST PA LAT INDICATION:   fever COMPARISON: None. FINDINGS: PA and lateral radiographs of the chest demonstrate clear lungs. The  
cardiac and mediastinal contours and pulmonary vascularity are normal.  The  
bones and soft tissues are within normal limits. CT Results  (Last 48 hours) None Assessment and Plan:  
 
Hospital Problems as of 11/7/2018 Date Reviewed: 10/31/2017 Codes Class Noted - Resolved POA * (Principal) Sepsis due to undetermined organism, with acute renal failure ([facility] ICD-10-CM: A41.9, R65.20, N17.9 ICD-9-CM: 038.9, 995.92, 584.9  11/7/2018 - Present Yes History of kidney transplant ICD-10-CM: Z94.0 ICD-9-CM: V42.0  10/31/2017 - Present Yes History of liver transplant ([facility] ICD-10-CM: Z94.4 ICD-9-CM: V42.7  5/28/2017 - Present Yes Autoimmune hepatitis ([facility] ICD-10-CM: K75.4 ICD-9-CM: 571.42  5/28/2017 - Present Yes  HTN (hypertension) ICD-10-CM: I10 
 ICD-9-CM: 401.9  11/7/2018 - Present Yes Fever ICD-10-CM: R50.9 ICD-9-CM: 780.60  11/7/2018 - Present Yes PLAN: 
· Admit inpatient to medical bed · Continue IV zosyn for now. F/u blood and urine cultures · Will hold off any more vancomycin as Cr elevated · Resume prograf and prednisone. ED has contacted transplant center tonight at [address] and they will accept patient in transfer once bed available · Consult Nephrology in am if no immediate ability in am for transfer. With elevated Cr concern that patient could be developing transplant rejection · Monitor BMP, Mg, Phos. NO NSAIDS · Will give IVF hydration NS at 150 cc hr to see if renal function improves · Check UA and urine eosinophils. Check CK · Transaminases appear stable at present but does have elevated ammonia · She will need to see GI/hepatology at [address] upon transfer · Continue Norvasc for hypertension · SCDs for DVT prophylaxis. Incentive spirometry Estimated LOS:  Greater than 2 midnights Signed: 
Demian Rodríguez MD

## 2018-11-07 NOTE — PROGRESS NOTES
Problem: Falls - Risk of 
Goal: *Absence of Falls Document Katie Casper Fall Risk and appropriate interventions in the flowsheet. Outcome: Progressing Towards Goal 
Fall Risk Interventions: 
  
 
  
 
Medication Interventions: Evaluate medications/consider consulting pharmacy

## 2018-11-07 NOTE — ED NOTES
TRANSFER - OUT REPORT: 
 
Verbal report given to OPAL Tovar on [address]  being transferred to [address] [place] [address] for routine progression of care Report consisted of patients Situation, Background, Assessment and  
Recommendations(SBAR). Information from the following report(s) SBAR, ED Summary, [facility] and Recent Results was reviewed with the receiving nurse. Lines:  
Peripheral IV 11/06/18 Left Antecubital (Active) Site Assessment Clean, dry, & intact 11/6/2018 10:33 PM  
Phlebitis Assessment 0 11/6/2018 10:33 PM  
Infiltration Assessment 0 11/6/2018 10:33 PM  
Dressing Status Clean, dry, & intact 11/6/2018 10:33 PM  
Dressing Type Transparent 11/6/2018 10:33 PM  
Hub Color/Line Status Blue 11/6/2018 10:33 PM  
Action Taken Blood drawn 11/6/2018 10:33 PM  
   
Peripheral IV 11/06/18 Distal;Inferior; Lower; Posterior;Right Arm (Active) Opportunity for questions and clarification was provided. Patient transported with: 
 [facility] [employer]

## 2018-11-07 NOTE — PROGRESS NOTES
Problem: Interdisciplinary Rounds Goal: Interdisciplinary Rounds Outcome: Not Progressing Towards Goal 
Interdisciplinary team rounds were held 11/7/2018 with the following team members:Care Management, Nursing, Physical Therapy, Physician, Respiratory Therapy and Clinical Coordinator and the patient. Plan of care discussed. See clinical pathway and/or care plan for interventions and desired outcomes.

## 2018-11-07 NOTE — PROGRESS NOTES
Pt resting in bed watching tv with family at the bedside. Pt denies N/V after eating dinner. SBAR report given to oncoming nurse.

## 2018-11-08 LAB
ALBUMIN SERPL-MCNC: 2.5 G/DL (ref 3.5–5)
ALBUMIN/GLOB SERPL: 0.8 {RATIO} (ref 1.2–3.5)
ALP SERPL-CCNC: 57 U/L (ref 50–136)
ALT SERPL-CCNC: 35 U/L (ref 12–65)
ANION GAP SERPL CALC-SCNC: 8 MMOL/L (ref 7–16)
AST SERPL-CCNC: 25 U/L (ref 15–37)
BILIRUB DIRECT SERPL-MCNC: 0.2 MG/DL
BILIRUB SERPL-MCNC: 0.4 MG/DL (ref 0.2–1.1)
BUN SERPL-MCNC: 20 MG/DL (ref 6–23)
CALCIUM SERPL-MCNC: 7.1 MG/DL (ref 8.3–10.4)
CHLORIDE SERPL-SCNC: 113 MMOL/L (ref 98–107)
CO2 SERPL-SCNC: 21 MMOL/L (ref 21–32)
CREAT SERPL-MCNC: 1.72 MG/DL (ref 0.6–1)
GLOBULIN SER CALC-MCNC: 3.3 G/DL (ref 2.3–3.5)
GLUCOSE SERPL-MCNC: 135 MG/DL (ref 65–100)
HAPTOGLOB SERPL-MCNC: 407 MG/DL (ref 30–200)
LDH SERPL L TO P-CCNC: 220 U/L (ref 100–190)
PATH REV BLD -IMP: NORMAL
POTASSIUM SERPL-SCNC: 3.3 MMOL/L (ref 3.5–5.1)
PROT SERPL-MCNC: 5.8 G/DL (ref 6.3–8.2)
SODIUM SERPL-SCNC: 142 MMOL/L (ref 136–145)
VANCOMYCIN TROUGH SERPL-MCNC: 7.8 UG/ML (ref 5–20)

## 2018-11-08 PROCEDURE — 74011250636 HC RX REV CODE- 250/636: Performed by: INTERNAL MEDICINE

## 2018-11-08 PROCEDURE — 80048 BASIC METABOLIC PNL TOTAL CA: CPT

## 2018-11-08 PROCEDURE — 65660000000 HC RM CCU STEPDOWN

## 2018-11-08 PROCEDURE — 36415 COLL VENOUS BLD VENIPUNCTURE: CPT

## 2018-11-08 PROCEDURE — 74011250636 HC RX REV CODE- 250/636: Performed by: HOSPITALIST

## 2018-11-08 PROCEDURE — 80202 ASSAY OF VANCOMYCIN: CPT

## 2018-11-08 PROCEDURE — 77030020263 HC SOL INJ SOD CL0.9% LFCR 1000ML

## 2018-11-08 PROCEDURE — 83010 ASSAY OF HAPTOGLOBIN QUANT: CPT

## 2018-11-08 PROCEDURE — 83615 LACTATE (LD) (LDH) ENZYME: CPT

## 2018-11-08 PROCEDURE — 74011250637 HC RX REV CODE- 250/637: Performed by: HOSPITALIST

## 2018-11-08 PROCEDURE — 74011000258 HC RX REV CODE- 258: Performed by: HOSPITALIST

## 2018-11-08 PROCEDURE — 80076 HEPATIC FUNCTION PANEL: CPT

## 2018-11-08 RX ADMIN — PIPERACILLIN SODIUM,TAZOBACTAM SODIUM 4.5 G: 4; .5 INJECTION, POWDER, FOR SOLUTION INTRAVENOUS at 10:50

## 2018-11-08 RX ADMIN — SODIUM CHLORIDE 150 ML/HR: 900 INJECTION, SOLUTION INTRAVENOUS at 01:33

## 2018-11-08 RX ADMIN — TACROLIMUS 6 MG: 1 CAPSULE ORAL at 08:28

## 2018-11-08 RX ADMIN — SODIUM CHLORIDE 150 ML/HR: 900 INJECTION, SOLUTION INTRAVENOUS at 08:46

## 2018-11-08 RX ADMIN — MYCOPHENOLATE MOFETIL 1000 MG: 500 TABLET ORAL at 08:27

## 2018-11-08 RX ADMIN — AMLODIPINE BESYLATE 10 MG: 10 TABLET ORAL at 08:27

## 2018-11-08 RX ADMIN — METHYLPREDNISOLONE SODIUM SUCCINATE 40 MG: 40 INJECTION, POWDER, FOR SOLUTION INTRAMUSCULAR; INTRAVENOUS at 08:28

## 2018-11-08 RX ADMIN — Medication 10 ML: at 21:36

## 2018-11-08 RX ADMIN — PIPERACILLIN SODIUM,TAZOBACTAM SODIUM 4.5 G: 4; .5 INJECTION, POWDER, FOR SOLUTION INTRAVENOUS at 17:55

## 2018-11-08 RX ADMIN — PIPERACILLIN SODIUM,TAZOBACTAM SODIUM 4.5 G: 4; .5 INJECTION, POWDER, FOR SOLUTION INTRAVENOUS at 01:33

## 2018-11-08 RX ADMIN — TACROLIMUS 6 MG: 1 CAPSULE ORAL at 17:55

## 2018-11-08 RX ADMIN — SODIUM CHLORIDE 100 ML/HR: 900 INJECTION, SOLUTION INTRAVENOUS at 20:27

## 2018-11-08 RX ADMIN — Medication 5 ML: at 17:55

## 2018-11-08 NOTE — PROGRESS NOTES
Pt resting in bed watching tv with family at the bedside. Pt has no complaints at this time. SBAR report given to oncoming nurse.

## 2018-11-08 NOTE — PROGRESS NOTES
[facility] PROGRESS NOTE Follow up for: MICHAEL/renal transplant Subjective:  
Patient seen and examined. Chart, notes, labs, imaging, results all reviewed. Feeling some better. Nausea improved. Ate some breakfast. No sob, cp, n/v. No tenderness over transplant ROS: 
Gen - no fever, no chills, appetite poor CV - no chest pain, no orthopnea Lung - no shortness of breath, no cough Abd - no tenderness, no nausea, no vomiting Ext - no edema Objective:  
Exam: 
Vitals:  
 11/07/18 2303 11/08/18 0035 11/08/18 0320 11/08/18 6505 BP: 99/70  105/71 113/80 Pulse: 89  79 80 Resp: 18 18 18 Temp: 98.3 °F (36.8 °C)  99.1 °F (37.3 °C) 99.5 °F (37.5 °C) SpO2: 97%  99% 100% Weight:  77 kg (169 lb 11.2 oz) Height:      
 
 
No intake or output data in the 24 hours ending 11/08/18 0929 Current Facility-Administered Medications Medication Dose Route Frequency  amLODIPine (NORVASC) tablet 10 mg  10 mg Oral DAILY  sodium chloride (NS) flush 5-10 mL  5-10 mL IntraVENous Q8H  
 sodium chloride (NS) flush 5-10 mL  5-10 mL IntraVENous PRN  
 0.9% sodium chloride infusion  150 mL/hr IntraVENous CONTINUOUS  
 ondansetron (ZOFRAN) injection 4 mg  4 mg IntraVENous Q4H PRN  promethazine (PHENERGAN) with saline injection 25 mg  25 mg IntraVENous Q4H PRN  
 acetaminophen (TYLENOL) tablet 650 mg  650 mg Oral Q6H PRN  
 influenza vaccine 2018-19 (6 mos+)(PF) (FLUARIX QUAD/FLULAVAL QUAD) injection 0.5 mL  0.5 mL IntraMUSCular PRIOR TO DISCHARGE  tacrolimus (PROGRAF) capsule 6 mg  6 mg Oral BID  mycophenolate (CELLCEPT) tablet 1,000 mg  1,000 mg Oral ACB&D  
 methylPREDNISolone (PF) (SOLU-MEDROL) injection 40 mg  40 mg IntraVENous DAILY  piperacillin-tazobactam (ZOSYN) 4.5 g in 0.9% sodium chloride (MBP/ADV) 100 mL  4.5 g IntraVENous Q8H  
 
 
EXAM 
GEN - Alert, oriented, in no distress CV - S1, S2, RRR, no rub, murmur, or gallop Lung - clear to auscultation bilaterally Abd - soft, nontender, BS present. No RLQ transplant tenderness or bogginess Ext - no edema Recent Labs 11/07/18 
1929 11/07/18 
0516 11/06/18 
2238 WBC  --  7.4 10.9 HGB 9.8* 10.5* 11.0*  
HCT  --  31.0* 33.0*  
PLT  --  82* 141* Recent Labs 11/08/18 
0329 11/07/18 
1929 11/07/18 
1142 11/07/18 
4842  141 139 138  
K 3.3* 3.3* 3.4* 3.2*  
* 110* 107 106 CO2 21 22 21 20* BUN 20 19 19 20 CREA 1.72* 1.85* 1.90* 1.94* CA 7.1* 7.4* 7.3* 7.3*  
* 187* 135* 144* MG  --   --   --  1.8 PHOS  --   --   --  1.4* Assessment and Plan: MICHAEL - renal transplant rejection vs volume depletion vs ATN in setting of bacteremia 
- baseline transplant creatinine is 0.8-0.9 mg/dL. Creatinine up top 1.9 on admit 
- concern is for renal transplant rejection since she has been without all of her immunosuppressants for 1 week 
- immunosuppressants resumed at previous home doses with the exception of prednisone. Started on solumedrol 40 mg IV daily (dose decreased from 125 mg secondary to infection) - UA large blood but no rbc with anemia and thrombocytopenia. No other s/o hemolysis. Peripheral smear, hemoblobinuria, LDH, haptoglobin pending 
- Urine eos negative. BK pending 
- creatinine trending down slowly with IVF, 1.94-->1.72 
- renal transplant US pending 
- nonoliguric. On IVF. Decrease rate today to 100 cc/hr 
- [facility] has accepted her when bed available GNR bacteremia/fevers 
- on zosyn 
- fevers trending down 
  
Liver and renal transplant 07/2017 
- LFT's normal 
- MICHAEL as above. Resume immunosuppressants as above + increase steroids - workup for MICHAEL as above 
  Thrombocytopenia and anemia - workup as above Elex Loss, ACNP

## 2018-11-08 NOTE — PROGRESS NOTES
Pt is resting in bed watching tv with family/friend at the bedside. Pt has no complaints at this time. Will continue to monitor.

## 2018-11-08 NOTE — PROGRESS NOTES
Pt resting in bed watching tv. Pt ate 50 % of her breakfast. No s/sx of N/V. No complaints at this time. Bed is in low and locked position. Call light within reach. Pt instructed to ask for assistance if needed. Will continue to monitor.

## 2018-11-08 NOTE — PROGRESS NOTES
Hospitalist Progress Note   
2018 Admit Date: 2018 10:27 PM  
NAME: Ronel Aviles :  1980 MRN:  [id number] Attending: Tila Figueroa MD 
PCP:  Joaquín Castro MD 
 
SUBJECTIVE:  
From H&P:  \"Patient is a very pleasant 44 y/o female with hx of autoimmune hepatitis who underwent a liver transplant in . Later rejected this and had renal failure so underwent repeat liver transplant and a renal transplant 2017. She is being followed by her transplant specialist at [address]. She recently had a change in insurance coverage and when she went to get her rejection medications re-filled found out they were not covered and could not afford them. Her medication regimen includes prednisone, cellcept and prografSo she has been out of them for at least a week and is concern that she may be rejecting her organs. For the past couple days has had some malaise, myalgias, nausea and vomiting. Thought she may have an acute viral illness. Came to ED tonight when developed a fever. Initially tachycardic and tachypneic with a temp of 103. 1. Her Cr is increased to 1.86. She was cultured and started on broad-spectrum antibiotics. ED called the transplant center at [address] who agreed to accept patient in transfer however no baeds available tonight. Patient tested negative for influenza, chest xray is clear. \" 
 
18:  Patient is feeling much better today, although is she aware that she has gram negative radha bacteremia. Denies pain. Feeling better overall. Review of Systems negative with exception of pertinent positives noted above PHYSICAL EXAM  
 
Visit Vitals /81 (BP 1 Location: Right arm, BP Patient Position: At rest) Pulse 78 Temp 98.7 °F (37.1 °C) Resp 18 Ht 5' 4\" (1.626 m) Wt 77 kg (169 lb 11.2 oz) SpO2 96% BMI 29.13 kg/m² Temp (24hrs), Av °F (37.2 °C), Min:98.2 °F (36.8 °C), Max:99.9 °F (37.7 °C) Oxygen Therapy O2 Sat (%): 96 % (18 1106) Pulse via Oximetry: 91 beats per minute (11/07/18 0005) O2 Device: Room air (11/06/18 2221) No intake or output data in the 24 hours ending 11/08/18 1147 General: No acute distress   
Lungs:  CTA Bilaterally. Heart:  Regular rate and rhythm,  No murmur, rub, or gallop Abdomen: Soft, Non distended, Non tender, Positive bowel sounds Extremities: No cyanosis, clubbing or edema Neurologic:  No focal deficits ASSESSMENT Active Hospital Problems Diagnosis Date Noted  Sepsis due to undetermined organism, with acute renal failure ([facility] 11/07/2018  
 HTN (hypertension) 11/07/2018  Fever 11/07/2018  History of kidney transplant 10/31/2017  Autoimmune hepatitis ([facility] 05/28/2017  History of liver transplant ([facility] [facility] 05/28/2017 Plan: 
Gram Negative Josh Bacteremia 
- Continue IV zosyn, should be appropriate coverage - Awaiting culture speciation and sensitivities Concern for Transplant Rejection - Appreciate Nephrology following closely 
- Continue plan per their recommendations - [facility] transplant service has accepted patient, but awaiting bed 
- Hopefully, symptoms/condition is more 2/2 bacteremia, MICHAEL DISPO:  Continue IV abx. Pending clinical course Signed By: Ana Harrington MD   
 November 8, 2018

## 2018-11-08 NOTE — PROGRESS NOTES
Patient up to bathroom, no needs expressed. IV fluids infusing, remains on IV antibiotics to start steroids today. Report to be given to day shift nurse.

## 2018-11-08 NOTE — ROUTINE PROCESS
Assumed care of patient, report received from [facility]. Patient resting in bed, watching TV. Denies nausea at present, was able to eat dinner. IV fluids infusing, Zosyn infusing. Family at bedside, patient denies needs, call light in reach.

## 2018-11-08 NOTE — PROGRESS NOTES
Problem: Falls - Risk of 
Goal: *Absence of Falls Document Miranda Aranda Fall Risk and appropriate interventions in the flowsheet. Outcome: Progressing Towards Goal 
Fall Risk Interventions: 
  
 
  
 
Medication Interventions: Evaluate medications/consider consulting pharmacy

## 2018-11-09 LAB
ANION GAP SERPL CALC-SCNC: 9 MMOL/L (ref 7–16)
BACTERIA SPEC CULT: ABNORMAL
BACTERIA SPEC CULT: NORMAL
BUN SERPL-MCNC: 16 MG/DL (ref 6–23)
CALCIUM SERPL-MCNC: 7.7 MG/DL (ref 8.3–10.4)
CHLORIDE SERPL-SCNC: 114 MMOL/L (ref 98–107)
CO2 SERPL-SCNC: 21 MMOL/L (ref 21–32)
CREAT SERPL-MCNC: 1.26 MG/DL (ref 0.6–1)
ERYTHROCYTE [DISTWIDTH] IN BLOOD BY AUTOMATED COUNT: 13.1 %
GLUCOSE SERPL-MCNC: 116 MG/DL (ref 65–100)
GRAM STN SPEC: ABNORMAL
HCT VFR BLD AUTO: 29.4 % (ref 35.8–46.3)
HGB BLD-MCNC: 9.8 G/DL (ref 11.7–15.4)
MCH RBC QN AUTO: 30.2 PG (ref 26.1–32.9)
MCHC RBC AUTO-ENTMCNC: 33.3 G/DL (ref 31.4–35)
MCV RBC AUTO: 90.7 FL (ref 79.6–97.8)
NRBC # BLD: 0 K/UL (ref 0–0.2)
PLATELET # BLD AUTO: 109 K/UL (ref 150–450)
PMV BLD AUTO: 12.8 FL (ref 9.4–12.3)
POTASSIUM SERPL-SCNC: 3 MMOL/L (ref 3.5–5.1)
RBC # BLD AUTO: 3.24 M/UL (ref 4.05–5.2)
SERVICE CMNT-IMP: ABNORMAL
SERVICE CMNT-IMP: NORMAL
SODIUM SERPL-SCNC: 144 MMOL/L (ref 136–145)
WBC # BLD AUTO: 6.9 K/UL (ref 4.3–11.1)

## 2018-11-09 PROCEDURE — 77030020263 HC SOL INJ SOD CL0.9% LFCR 1000ML

## 2018-11-09 PROCEDURE — 74011250636 HC RX REV CODE- 250/636: Performed by: INTERNAL MEDICINE

## 2018-11-09 PROCEDURE — 74011250636 HC RX REV CODE- 250/636: Performed by: HOSPITALIST

## 2018-11-09 PROCEDURE — 74011000258 HC RX REV CODE- 258: Performed by: HOSPITALIST

## 2018-11-09 PROCEDURE — 36415 COLL VENOUS BLD VENIPUNCTURE: CPT

## 2018-11-09 PROCEDURE — 87040 BLOOD CULTURE FOR BACTERIA: CPT

## 2018-11-09 PROCEDURE — 74011250637 HC RX REV CODE- 250/637: Performed by: INTERNAL MEDICINE

## 2018-11-09 PROCEDURE — 80197 ASSAY OF TACROLIMUS: CPT

## 2018-11-09 PROCEDURE — 80048 BASIC METABOLIC PNL TOTAL CA: CPT

## 2018-11-09 PROCEDURE — 76937 US GUIDE VASCULAR ACCESS: CPT

## 2018-11-09 PROCEDURE — 65660000000 HC RM CCU STEPDOWN

## 2018-11-09 PROCEDURE — 74011250637 HC RX REV CODE- 250/637: Performed by: HOSPITALIST

## 2018-11-09 PROCEDURE — 85027 COMPLETE CBC AUTOMATED: CPT

## 2018-11-09 PROCEDURE — 74011250637 HC RX REV CODE- 250/637: Performed by: FAMILY MEDICINE

## 2018-11-09 RX ORDER — CIPROFLOXACIN 500 MG/1
500 TABLET ORAL EVERY 12 HOURS
Status: DISCONTINUED | OUTPATIENT
Start: 2018-11-09 | End: 2018-11-10 | Stop reason: HOSPADM

## 2018-11-09 RX ORDER — POTASSIUM CHLORIDE 20 MEQ/1
40 TABLET, EXTENDED RELEASE ORAL
Status: COMPLETED | OUTPATIENT
Start: 2018-11-09 | End: 2018-11-09

## 2018-11-09 RX ADMIN — TACROLIMUS 6 MG: 1 CAPSULE ORAL at 17:35

## 2018-11-09 RX ADMIN — CIPROFLOXACIN 500 MG: 500 TABLET, FILM COATED ORAL at 20:00

## 2018-11-09 RX ADMIN — POTASSIUM CHLORIDE 40 MEQ: 20 TABLET, EXTENDED RELEASE ORAL at 09:24

## 2018-11-09 RX ADMIN — PIPERACILLIN SODIUM,TAZOBACTAM SODIUM 4.5 G: 4; .5 INJECTION, POWDER, FOR SOLUTION INTRAVENOUS at 12:00

## 2018-11-09 RX ADMIN — TACROLIMUS 6 MG: 1 CAPSULE ORAL at 09:35

## 2018-11-09 RX ADMIN — Medication 5 ML: at 17:31

## 2018-11-09 RX ADMIN — PIPERACILLIN SODIUM,TAZOBACTAM SODIUM 4.5 G: 4; .5 INJECTION, POWDER, FOR SOLUTION INTRAVENOUS at 01:41

## 2018-11-09 RX ADMIN — AMLODIPINE BESYLATE 10 MG: 10 TABLET ORAL at 09:24

## 2018-11-09 RX ADMIN — Medication 10 ML: at 22:46

## 2018-11-09 RX ADMIN — Medication 10 ML: at 05:13

## 2018-11-09 RX ADMIN — POTASSIUM CHLORIDE 40 MEQ: 20 TABLET, EXTENDED RELEASE ORAL at 17:30

## 2018-11-09 RX ADMIN — SODIUM CHLORIDE 100 ML/HR: 900 INJECTION, SOLUTION INTRAVENOUS at 06:00

## 2018-11-09 NOTE — PROGRESS NOTES
Hospitalist Progress Note   
2018 Admit Date: 2018 10:27 PM  
NAME: Simon Aviles :  1980 MRN:  [id number] Attending: Speedy Foster MD 
PCP:  Mango Meadows MD 
 
SUBJECTIVE:  
From H&P:  \"Patient is a very pleasant 42 y/o female with hx of autoimmune hepatitis who underwent a liver transplant in . Later rejected this and had renal failure so underwent repeat liver transplant and a renal transplant 2017. She is being followed by her transplant specialist at [address]. She recently had a change in insurance coverage and when she went to get her rejection medications re-filled found out they were not covered and could not afford them. Her medication regimen includes prednisone, cellcept and prografSo she has been out of them for at least a week and is concern that she may be rejecting her organs. For the past couple days has had some malaise, myalgias, nausea and vomiting. Thought she may have an acute viral illness. Came to ED tonight when developed a fever. Initially tachycardic and tachypneic with a temp of 103. 1. Her Cr is increased to 1.86. She was cultured and started on broad-spectrum antibiotics. ED called the transplant center at [address] who agreed to accept patient in transfer however no baeds available tonight. Patient tested negative for influenza, chest xray is clear. \" 
 
18:  Patient is feeling much better today, although is she aware that she has gram negative radha bacteremia. Denies pain. Feeling better overall. 18:  Patient feels pretty good today. She did not sleep well last night however. In good spirits. Last fever was on 18 at 11:07 AM.  Reports that she has spoken with her Transplant Team, and that her immunosuppression meds have been arranged for the next year. She also reports that her meds were delivered to her house yesterday, so whenever she is medically stable for discharge, she will have them available. Review of Systems negative with exception of pertinent positives noted above PHYSICAL EXAM  
 
Visit Vitals /84 (BP 1 Location: Left arm, BP Patient Position: At rest) Pulse 70 Temp 97.9 °F (36.6 °C) Resp 18 Ht 5' 4\" (1.626 m) Wt 77.6 kg (171 lb 1.6 oz) SpO2 99% BMI 29.37 kg/m² Temp (24hrs), Av.2 °F (36.8 °C), Min:97.8 °F (36.6 °C), Max:98.4 °F (36.9 °C) Oxygen Therapy O2 Sat (%): 99 % (18 0732) Pulse via Oximetry: 91 beats per minute (18 0005) O2 Device: Room air (18 2221) Intake/Output Summary (Last 24 hours) at 2018 1136 Last data filed at 2018 7160 Gross per 24 hour Intake 2030 ml Output 1000 ml Net 1030 ml General: No acute distress   
Lungs:  CTA Bilaterally. Heart:  Regular rate and rhythm,  No murmur, rub, or gallop Abdomen: Soft, Non distended, Non tender, Positive bowel sounds Extremities: No cyanosis, clubbing or edema Neurologic:  No focal deficits ASSESSMENT Active Hospital Problems Diagnosis Date Noted  Sepsis due to undetermined organism, with acute renal failure ([facility] [address] 2018  
 HTN (hypertension) 2018  Fever 2018  History of kidney transplant 10/31/2017  Autoimmune hepatitis ([facility] [address] 2017  History of liver transplant ([facility] [address] [facility] 2017 Plan: 
Gram Negative Josh Bacteremia 
- E.coli 
- Continue IV zosyn - Culture speciation and sensitivities show sensitivity to Zosyn, consulting with ID for recommendations on ongoing PO therapy given her h/o liver and kidney transplant along with immunosuppression. Appreciate their consulation. Concern for Transplant Rejection - Appreciate Nephrology following closely 
- Continue plan per their recommendations - [facility] [place] [place] transplant service has accepted patient, but awaiting bed 
- Hopefully, symptoms/condition is more 2/2 bacteremia, MICHAEL DISPO:  Continue IV abx. Pending clinical course Signed By: Mariah Barnett MD   
 November 9, 2018

## 2018-11-09 NOTE — PROGRESS NOTES
Nurse from [facility] call to get update. Bed still not available but will call keep in contact. Nurse stated maybe this afternoon. Sarah Proctor

## 2018-11-09 NOTE — PROGRESS NOTES
Patient stable with no complaints at this time. Patient is resting in bed watching TV. Patient self sufficient and will call if assistance is needed. New IV placed by PICC team this AM.  Patient denies any pain and appears comfortable at this time. Call light within reach and patient instructed to call if assistance is needed. Report to be given to oncoming RN 7p-7a

## 2018-11-09 NOTE — PROGRESS NOTES
Problem: Falls - Risk of 
Goal: *Absence of Falls Document [facility] Fall Risk and appropriate interventions in the flowsheet. Outcome: Progressing Towards Goal 
Fall Risk Interventions: 
  
 
  
 
Medication Interventions: Bed/chair exit alarm

## 2018-11-09 NOTE — CONSULTS
Infectious Disease Consult    Today's Date: 11/9/2018   Admit Date: 11/6/2018    Impression:   · E. Coli bacteremia (11/6/18) - likely source is UTI, but not entirely clear clinically  · S/p renal and liver transplant, 7/2017  · Immunosuppression with prednisone, cellcept, tacrolimus  · Concern for possible transplant rejection having been off meds x 1 week  · MICHAEL - improved  · Autoimmune hepatitis s/p liver transplant 2002, failed  · ESRD s/p renal transplant    Plan:   · Recommend ciprofloxacin 500mg po BID for 14 additional days at discharge. · I ordered repeat blood cultures today just in case she has a deeper underlying problem. Still okay to discharge today if clinically okay from other perspectives. If blood cultures turn positive, she would need further evaluation such as CT abd/pelvis - would like to avoid contrast though. · No ID follow up required, but I did give her our card in case she has any problems at discharge. · Discussed taking probiotics during her course of treatment. Anti-infectives:   · IV zosyn    Subjective:   Date of Consultation:  November 9, 2018  Referring Physician: Dr. Js Flor    Patient is a 43 y.o. female with h/o renal/liver transplants as outlined above. She began having problems earlier this week when she ran out of transplant meds, then began her menstrual period, and then began having fever, chills, nausea and vomiting. She had no diarrhea or abdominal pain. On presentation she had MICHAEL and BCX are positive for E. Coli. UCX is negative, and transplant US is negative. No RUQ pain. She has been on zosyn and has been restarted on her transplant meds. Her fevers have resolved and she is clinically doing well. ID is consulted for further recommendations.       Patient Active Problem List   Diagnosis Code    Autoimmune hepatitis ([facility] [address] K75.4    History of liver transplant ([facility] [address] Z94.4    History of kidney transplant Z94.0    Hyperglycemia, drug-induced R73.9, T50.434N  Sepsis due to undetermined organism, with acute renal failure (HCC) A41.9, R65.20, N17.9    HTN (hypertension) I10    Fever R50.9     Past Medical History:   Diagnosis Date    Diabetes ([address]     Infectious disease     hepatitis c    Liver disease     transplant , liver and kidney transplant 2017      Family History   Problem Relation Age of Onset    Cancer Mother     Hypertension Father     Stroke Father     Hypertension Sister       Social History     Tobacco Use    Smoking status: Never Smoker    Smokeless tobacco: Never Used   Substance Use Topics    Alcohol use: No     Past Surgical History:   Procedure Laterality Date    HX  SECTION      HX HERNIA REPAIR      HX LIVER TRANSPLANT      10/7/02 and 17    HX TRANSPLANT      liver      Prior to Admission medications    Medication Sig Start Date End Date Taking? Authorizing Provider   amLODIPine (NORVASC) 10 mg tablet Take 10 mg by mouth daily. Yes Provider, Historical   predniSONE (DELTASONE) 10 mg tablet Take  by mouth daily (with breakfast). Yes Provider, Historical   CELLCEPT 500 mg Tab take 1,000 mg by mouth two (2) times a day. Yes Other, MD Coco   PROGRAF 1 mg Cap Take 1 mg by mouth two (2) times a day. Yes Other, MD Coco       No Known Allergies     Review of Systems:  A comprehensive review of systems was negative except for that written in the History of Present Illness. Objective:     Visit Vitals  /74   Pulse 79   Temp 97.8 °F (36.6 °C)   Resp 18   Ht 5' 4\" (1.626 m)   Wt 77.6 kg (171 lb 1.6 oz)   SpO2 99%   BMI 29.37 kg/m²     Temp (24hrs), Av.1 °F (36.7 °C), Min:97.8 °F (36.6 °C), Max:98.4 °F (36.9 °C)       Lines:  Peripheral IV:       Physical Exam:    General:  Alert, cooperative, obese black female in no distress   Eyes:  Sclera anicteric. Pupils equally round and reactive to light.    Mouth/Throat: Mucous membranes normal, oral pharynx clear   Neck: Supple   Lungs:   Clear to auscultation bilaterally, good effort   CV:  Regular rate and rhythm,no murmur, click, rub or gallop   Abdomen:   Soft, non-tender. bowel sounds normal. non-distended   Extremities: No cyanosis or edema   Skin: Skin color, texture, turgor normal. no acute rash or lesions   Lymph nodes: Cervical and supraclavicular normal   Musculoskeletal: No swelling or deformity   Lines/Devices:  Intact, no erythema, drainage or tenderness   Psych: Alert and oriented, normal mood affect given the setting       Data Review:     CBC:  Recent Labs     11/09/18  0508 11/07/18 1929 11/07/18  0516 11/06/18 2238   WBC 6.9  --  7.4 10.9   GRANS  --   --  79* 81*   MONOS  --   --  13* 10   EOS  --   --  0* 0*   ANEU  --   --  5.8 8.8*   ABL  --   --  0.6 0.9   HGB 9.8* 9.8* 10.5* 11.0*   HCT 29.4*  --  31.0* 33.0*   *  --  82* 141*       BMP:  Recent Labs     11/09/18  0508 11/08/18  0329 11/07/18 1929   CREA 1.26* 1.72* 1.85*   BUN 16 20 19    142 141   K 3.0* 3.3* 3.3*   * 113* 110*   CO2 21 21 22   AGAP 9 8 9   * 135* 187*       LFTS:  Recent Labs     11/08/18  0959 11/07/18  0516 11/06/18 2238   TBILI 0.4 0.8 0.8   ALT 35 31 35   SGOT 25 20 27   AP 57 71 77   TP 5.8* 6.7 7.5   ALB 2.5* 2.7* 3.1*       Microbiology:     All Micro Results     Procedure Component Value Units Date/Time    CULTURE, URINE [[id number]] Collected:  11/07/18 0015    Order Status:  Completed Specimen:  Urine from Clean catch Updated:  11/09/18 0939     Special Requests: NO SPECIAL REQUESTS        Culture result:       <10,000  COLONIES/mL  NORMAL SKIN HARVEY ISOLATED      CULTURE, BLOOD [[id number]]  (Abnormal) Collected:  11/06/18 2238    Order Status:  Completed Specimen:  Blood Updated:  11/09/18 0727     Special Requests: --        LEFT  Antecubital       GRAM STAIN GRAM NEGATIVE RODS         ANAEROBIC BOTTLE POSITIVE               CRITICAL RESULT NOT CALLED DUE TO PREVIOUS NOTIFICATION OF CRITICAL RESULT WITHIN THE LAST 24 HOURS. Culture result: GRAM NEGATIVE RODS         REFER TO ACC [id number] FOR ID AND SUSCEPTIBILITY    CULTURE, BLOOD [[id number]]  (Abnormal)  (Susceptibility) Collected:  18 2303    Order Status:  Completed Specimen:  Blood Updated:  18     Special Requests: --        RIGHT  ARM       GRAM STAIN GRAM NEGATIVE RODS               AEROBIC AND ANAEROBIC BOTTLES                  RESULTS VERIFIED, PHONED TO AND READ BACK BY Romulo Reid RN  [phone]            Culture result: ESCHERICHIA COLI       INFLUENZA A & B AG (RAPID TEST) [[id number]] Collected:  18    Order Status:  Completed Specimen:  Nasopharyngeal from Nasal washing Updated:  18 0006     Influenza A Ag NEGATIVE         Comment: NEGATIVE FOR THE PRESENCE OF INFLUENZA A ANTIGEN  INFECTION DUE TO INFLUENZA A CANNOT BE RULED OUT. BECAUSE THE ANTIGEN PRESENT IN THE SAMPLE MAY BE BELOW  THE DETECTION LIMIT OF THE TEST. A NEGATIVE TEST IS PRESUMPTIVE AND IT IS RECOMMENDED THAT THESE RESULTS BE CONFIRMED BY VIRAL CULTURE OR AN FDA-CLEARED INFLUENZA A AND B MOLECULAR ASSAY. Influenza B Ag NEGATIVE         Comment: NEGATIVE FOR THE PRESENCE OF INFLUENZA B ANTIGEN  INFECTION DUE TO INFLUENZA B CANNOT BE RULED OUT. BECAUSE THE ANTIGEN PRESENT IN THE SAMPLE MAY BE BELOW  THE DETECTION LIMIT OF THE TEST. A NEGATIVE TEST IS PRESUMPTIVE AND IT IS RECOMMENDED THAT THESE RESULTS BE CONFIRMED BY VIRAL CULTURE OR AN FDA-CLEARED INFLUENZA A AND B MOLECULAR ASSAY. Source NASOPHARYNGEAL             Imagin/7/18  renal transplant: IMPRESSION:  No hydronephrosis in the transplant kidney. Normal resistive  indices in the transplant kidney. Patent transplant renal artery. 18 CXR: FINDINGS: PA and lateral radiographs of the chest demonstrate clear lungs. The  cardiac and mediastinal contours and pulmonary vascularity are normal.  The  bones and soft tissues are within normal limits.      Signed By: Lalita Knowles Jay Jay Adamson MD     November 9, 2018

## 2018-11-09 NOTE — PROGRESS NOTES
Patient resting in bed, alert and oriented, cooperative with care, no visual s/s of pain or distress, safety measures in place, call light within reach.

## 2018-11-09 NOTE — PROGRESS NOTES
Patient voices no concerns at this time. Patient is resting in bed with eyes closed. Patient states she did not sleep well last night. Patient denies any pain and appears comfortable at this time. Call light within reach and patient instructed to call if assistance needed. Will continue to monitor.

## 2018-11-09 NOTE — PROGRESS NOTES
[facility] PROGRESS NOTE Follow up for: MICHAEL/renal transplant Subjective:  
Patient seen and examined. Doing better, No tenderness over transplant ROS: 
Gen - no fever, no chills, appetite poor CV - no chest pain, no orthopnea Lung - no shortness of breath, no cough Abd - no tenderness, no nausea, no vomiting Ext - no edema Objective:  
Exam: 
Vitals:  
 11/09/18 0509 11/09/18 0732 11/09/18 0950 11/09/18 1120 BP:  122/84 104/74 Pulse:  70  79 Resp:  18  18 Temp:  97.9 °F (36.6 °C)  97.8 °F (36.6 °C) SpO2:  99%  99% Weight: 77.6 kg (171 lb 1.6 oz) Height:      
 
 
 
Intake/Output Summary (Last 24 hours) at 11/9/2018 1258 Last data filed at 11/9/2018 0772 Gross per 24 hour Intake 1910 ml Output  Net 1910 ml  
 
 
Current Facility-Administered Medications Medication Dose Route Frequency  amLODIPine (NORVASC) tablet 10 mg  10 mg Oral DAILY  sodium chloride (NS) flush 5-10 mL  5-10 mL IntraVENous Q8H  
 sodium chloride (NS) flush 5-10 mL  5-10 mL IntraVENous PRN  
 0.9% sodium chloride infusion  100 mL/hr IntraVENous CONTINUOUS  
 ondansetron (ZOFRAN) injection 4 mg  4 mg IntraVENous Q4H PRN  promethazine (PHENERGAN) with saline injection 25 mg  25 mg IntraVENous Q4H PRN  
 acetaminophen (TYLENOL) tablet 650 mg  650 mg Oral Q6H PRN  
 influenza vaccine 2018-19 (6 mos+)(PF) (FLUARIX QUAD/FLULAVAL QUAD) injection 0.5 mL  0.5 mL IntraMUSCular PRIOR TO DISCHARGE  tacrolimus (PROGRAF) capsule 6 mg  6 mg Oral BID  methylPREDNISolone (PF) (SOLU-MEDROL) injection 40 mg  40 mg IntraVENous DAILY  piperacillin-tazobactam (ZOSYN) 4.5 g in 0.9% sodium chloride (MBP/ADV) 100 mL  4.5 g IntraVENous Q8H  
 
 
EXAM 
GEN - Alert, oriented, in no distress CV - S1, S2, RRR, no rub, murmur, or gallop Lung - clear to auscultation bilaterally Abd - soft, nontender, BS present. No RLQ transplant tenderness or bogginess Ext - no edema Recent Labs 11/09/18 0188 11/07/18 1929 11/07/18 
0516 11/06/18 
2238 WBC 6.9  --  7.4 10.9 HGB 9.8* 9.8* 10.5* 11.0*  
HCT 29.4*  --  31.0* 33.0*  
*  --  82* 141* Recent Labs 11/09/18 
0155 11/08/18 
0329 11/07/18 1929 11/07/18 
9390  142 141   < > 138  
K 3.0* 3.3* 3.3*   < > 3.2*  
* 113* 110*   < > 106 CO2 21 21 22   < > 20* BUN 16 20 19   < > 20 CREA 1.26* 1.72* 1.85*   < > 1.94* CA 7.7* 7.1* 7.4*   < > 7.3*  
* 135* 187*   < > 144* MG  --   --   --   --  1.8 PHOS  --   --   --   --  1.4*  
 < > = values in this interval not displayed. Assessment and Plan: MICHAEL - renal transplant rejection vs volume depletion vs ATN in setting of bacteremia 
- baseline transplant creatinine is 0.8-0.9 mg/dL. Creatinine up top 1.9 on admit 
- concern is for renal transplant rejection since she has been without all of her immunosuppressants for 1 week 
- immunosuppressants resumed at previous home doses with the exception of prednisone. Started on solumedrol 40 mg IV daily (dose decreased from 125 mg secondary to infection) - UA large blood but no rbc with anemia and thrombocytopenia. No other s/o hemolysis. Peripheral smear, hemoblobinuria, LDH, haptoglobin pending 
- Urine eos negative. BK pending 
- creatinine trending down slowly with IVF, 1.94-->1.26 
- renal transplant US pending 
- nonoliguric. On IVF. Decrease rate today to 100 cc/hr 
- [facility] has accepted her when bed available GNR bacteremia/fevers 
- on zosyn 
- fevers trending down 
  
Liver and renal transplant 07/2017 
- LFT's normal 
- MICHAEL as above. Resume immunosuppressants as above + increase steroids - workup for MICHAEL as above 
  Thrombocytopenia and anemia - workup as above Overall continues to improve. Replace K Stop IVF Mary Marin MD

## 2018-11-10 VITALS
HEIGHT: 64 IN | DIASTOLIC BLOOD PRESSURE: 83 MMHG | OXYGEN SATURATION: 100 % | WEIGHT: 161.9 LBS | HEART RATE: 62 BPM | TEMPERATURE: 98.2 F | BODY MASS INDEX: 27.64 KG/M2 | SYSTOLIC BLOOD PRESSURE: 125 MMHG | RESPIRATION RATE: 18 BRPM

## 2018-11-10 LAB
ANION GAP SERPL CALC-SCNC: 7 MMOL/L (ref 7–16)
BACTERIA SPEC CULT: ABNORMAL
BACTERIA SPEC CULT: ABNORMAL
BUN SERPL-MCNC: 17 MG/DL (ref 6–23)
CALCIUM SERPL-MCNC: 7.8 MG/DL (ref 8.3–10.4)
CHLORIDE SERPL-SCNC: 111 MMOL/L (ref 98–107)
CO2 SERPL-SCNC: 23 MMOL/L (ref 21–32)
CREAT SERPL-MCNC: 0.99 MG/DL (ref 0.6–1)
GLUCOSE SERPL-MCNC: 95 MG/DL (ref 65–100)
GRAM STN SPEC: ABNORMAL
POTASSIUM SERPL-SCNC: 3.8 MMOL/L (ref 3.5–5.1)
SERVICE CMNT-IMP: ABNORMAL
SODIUM SERPL-SCNC: 141 MMOL/L (ref 136–145)

## 2018-11-10 PROCEDURE — 80048 BASIC METABOLIC PNL TOTAL CA: CPT

## 2018-11-10 PROCEDURE — 90471 IMMUNIZATION ADMIN: CPT

## 2018-11-10 PROCEDURE — 74011250636 HC RX REV CODE- 250/636: Performed by: FAMILY MEDICINE

## 2018-11-10 PROCEDURE — 74011250637 HC RX REV CODE- 250/637: Performed by: HOSPITALIST

## 2018-11-10 PROCEDURE — 74011250636 HC RX REV CODE- 250/636: Performed by: INTERNAL MEDICINE

## 2018-11-10 PROCEDURE — 74011250637 HC RX REV CODE- 250/637: Performed by: INTERNAL MEDICINE

## 2018-11-10 PROCEDURE — 90686 IIV4 VACC NO PRSV 0.5 ML IM: CPT | Performed by: FAMILY MEDICINE

## 2018-11-10 PROCEDURE — 36415 COLL VENOUS BLD VENIPUNCTURE: CPT

## 2018-11-10 RX ORDER — CIPROFLOXACIN 500 MG/1
500 TABLET ORAL EVERY 12 HOURS
Qty: 28 TAB | Refills: 0 | Status: SHIPPED | OUTPATIENT
Start: 2018-11-10 | End: 2019-12-11

## 2018-11-10 RX ADMIN — CIPROFLOXACIN 500 MG: 500 TABLET, FILM COATED ORAL at 09:07

## 2018-11-10 RX ADMIN — INFLUENZA VIRUS VACCINE 0.5 ML: 15; 15; 15; 15 SUSPENSION INTRAMUSCULAR at 11:20

## 2018-11-10 RX ADMIN — TACROLIMUS 6 MG: 1 CAPSULE ORAL at 09:09

## 2018-11-10 RX ADMIN — METHYLPREDNISOLONE SODIUM SUCCINATE 40 MG: 40 INJECTION, POWDER, FOR SOLUTION INTRAMUSCULAR; INTRAVENOUS at 09:07

## 2018-11-10 RX ADMIN — Medication 5 ML: at 05:32

## 2018-11-10 RX ADMIN — AMLODIPINE BESYLATE 10 MG: 10 TABLET ORAL at 09:07

## 2018-11-10 NOTE — PROGRESS NOTES
Patient is sleeping respirations are even/unlabored with no signs of distress at this time  Will continue to monitor

## 2018-11-10 NOTE — PROGRESS NOTES
Received BSR from [facility]  Patient is A/Ox4  Respirations are even unlabored on RA lung sounds are clear/diminshed  Patient is up adlib walks without gait disturbance  There are no signs of distress at this time  Will continue to monitor

## 2018-11-10 NOTE — PROGRESS NOTES
Discharge instructions and prescriptions provided and explained to the pt. Med side effect sheet reviewed. Opportunity for questions provided. Waiting on ride. Instructed to call once ready to leave.

## 2018-11-10 NOTE — DISCHARGE INSTRUCTIONS
Sepsis: Care Instructions  Your Care Instructions    Sepsis is an intense reaction to an infection. It can cause deadly damage to the body and lead to a dangerously low blood pressure. You may have inflammation across large areas of your body. It can damage tissue and even go deep into your organs. Infections that can lead to sepsis include:  · A skin infection such as from a cut. · A lung infection like pneumonia. · A kidney infection. · A gut infection such as E. coli. It's important to care for yourself and try to avoid infections so that you don't get sepsis again. Follow-up care is a key part of your treatment and safety. Be sure to make and go to all appointments, and call your doctor if you are having problems. It's also a good idea to know your test results and keep a list of the medicines you take. How can you care for yourself at home? · If your doctor prescribed antibiotics, take them as directed. Do not stop taking them just because you feel better. You need to take the full course of antibiotics. · Help prevent infections that could lead to sepsis:  ? Try to avoid colds and flu. If you must be around people who have a cold or the flu, wash your hands often. And get a flu vaccine every year. ? Get a pneumococcal vaccine shot (to prevent pneumonia, meningitis, and other infections). If you have had one before, ask your doctor if you need another dose. ? Clean any wounds or scrapes. · Do not smoke or use other tobacco products. When you quit smoking, you are less likely to get a cold, the flu, bronchitis, and pneumonia. If you need help quitting, talk to your doctor about stop-smoking programs and medicines. These can increase your chances of quitting for good. · To prevent dehydration, drink plenty of fluids. Choose water and other caffeine-free clear liquids until you feel better.  If you have kidney, heart, or liver disease and have to limit fluids, talk with your doctor before you increase the amount of fluids you drink. · Eat a healthy diet. Include fruits, vegetables, and whole grains in your diet every day. · If your doctor recommends it, try doing some physical activity. Walking is a good choice. Bit by bit, increase the amount you walk every day. When should you call for help? [id number] anytime you think you may need emergency care. For example, call if:    · You passed out (lost consciousness).    Call your doctor now or seek immediate medical care if:    · You have symptoms of sepsis. These may include:  ? Shortness of breath. ? A fast heart rate. ? Cool, pale, or clammy skin. ? Feeling confused.     · You are dizzy or lightheaded, or you feel like you may faint.     · You have a fever or chills.    Watch closely for changes in your health, and be sure to contact your doctor if:    · You do not get better as expected. Where can you learn more? Go to http://[employer].info/. Enter Y230 in the search box to learn more about \"Sepsis: Care Instructions. \"  Current as of: November 20, 2017  Content Version: 11.8  © 5524-1923 [employer]. Care instructions adapted under license by [employer] (which disclaims liability or warranty for this information). If you have questions about a medical condition or this instruction, always ask your healthcare professional. [address] any warranty or liability for your use of this information. DISCHARGE SUMMARY from Nurse    PATIENT INSTRUCTIONS:    After general anesthesia or intravenous sedation, for 24 hours or while taking prescription Narcotics:  · Limit your activities  · Do not drive and operate hazardous machinery  · Do not make important personal or business decisions  · Do  not drink alcoholic beverages  · If you have not urinated within 8 hours after discharge, please contact your surgeon on call.     Report the following to your surgeon:  · Excessive pain, swelling, redness or odor of or around the surgical area  · Temperature over 100.5  · Nausea and vomiting lasting longer than 4 hours or if unable to take medications  · Any signs of decreased circulation or nerve impairment to extremity: change in color, persistent  numbness, tingling, coldness or increase pain  · Any questions    What to do at Home:  *  Please give a list of your current medications to your Primary Care Provider. *  Please update this list whenever your medications are discontinued, doses are      changed, or new medications (including over-the-counter products) are added. *  Please carry medication information at all times in case of emergency situations. These are general instructions for a healthy lifestyle:    No smoking/ No tobacco products/ Avoid exposure to second hand smoke  Surgeon General's Warning:  Quitting smoking now greatly reduces serious risk to your health. Obesity, smoking, and sedentary lifestyle greatly increases your risk for illness    A healthy diet, regular physical exercise & weight monitoring are important for maintaining a healthy lifestyle    You may be retaining fluid if you have a history of heart failure or if you experience any of the following symptoms:  Weight gain of 3 pounds or more overnight or 5 pounds in a week, increased swelling in our hands or feet or shortness of breath while lying flat in bed. Please call your doctor as soon as you notice any of these symptoms; do not wait until your next office visit. Recognize signs and symptoms of STROKE:    F-face looks uneven    A-arms unable to move or move unevenly    S-speech slurred or non-existent    T-time-call 911 as soon as signs and symptoms begin-DO NOT go       Back to bed or wait to see if you get better-TIME IS BRAIN. Warning Signs of HEART ATTACK     Call 911 if you have these symptoms:   Chest discomfort.  Most heart attacks involve discomfort in the center of the chest that lasts more than a few minutes, or that goes away and comes back. It can feel like uncomfortable pressure, squeezing, fullness, or pain.  Discomfort in other areas of the upper body. Symptoms can include pain or discomfort in one or both arms, the back, neck, jaw, or stomach.  Shortness of breath with or without chest discomfort.  Other signs may include breaking out in a cold sweat, nausea, or lightheadedness. Don't wait more than five minutes to call 911 - MINUTES MATTER! Fast action can save your life. Calling 911 is almost always the fastest way to get lifesaving treatment. Emergency Medical Services staff can begin treatment when they arrive -- up to an hour sooner than if someone gets to the hospital by car. The discharge information has been reviewed with the patient. The patient verbalized understanding. Discharge medications reviewed with the patient and appropriate educational materials and side effects teaching were provided.   ___________________________________________________________________________________________________________________________________

## 2018-11-10 NOTE — DISCHARGE SUMMARY
Hospitalist Discharge Summary     Patient ID:  Guillermo Becker  [id number]  1 y.o.  1980  Admit date: 11/6/2018 10:27 PM  Discharge date and time: 11/10/2018  Attending: Margy Goodrich MD  PCP:  Pj Pro MD  Treatment Team: Attending Provider: Margy Goodrich MD; Consulting Provider: Narcisa Zheng MD; Care Manager: Az Ramsay.; Utilization Review: Ana M Burns RN    Principal Diagnosis Sepsis due to undetermined organism, with acute renal failure [facility])   Principal Problem:    Sepsis due to undetermined organism, with acute renal failure ([facility] (11/7/2018)    Active Problems:    Autoimmune hepatitis ([facility] (5/28/2017)      History of liver transplant ([facility] (5/28/2017)      History of kidney transplant (10/31/2017)      HTN (hypertension) (11/7/2018)      Fever (11/7/2018)             Hospital Course:  Please refer to the admission H&P for details of presentation. In summary, the patient is a very pleasant 44 y/o female with hx of autoimmune hepatitis who underwent a liver transplant in 2002. Later rejected this and had renal failure so underwent repeat liver transplant and a renal transplant 7/18/2017. She is being followed by her transplant specialist at [address]. She recently had a change in insurance coverage and when she went to get her rejection medications re-filled found out they were not covered and could not afford them. Her medication regimen includes prednisone, cellcept and prograf, and she has been out of them for at least a week and is concern that she may be rejecting her organs. For the past couple days prior to admission, she has had some malaise, myalgias, nausea and vomiting. Thought she may have an acute viral illness. Came to ED on 11/7/18 when she developed a fever. Initially, tachycardic and tachypneic with a temp of 103. 1. Her Cr is increased to 1.86. She was cultured and started on broad-spectrum antibiotics.  ED called the transplant center at [address] who agreed to accept patient in transfer however no beds were available. Patient tested negative for influenza, chest xray was clear. Patient was admitted and started on IV zosyn. A UA was slightly abnormal, but recent onset of menses complicated interpretation. Nephrology was consulted and followed throughout her stay. Blood cultures came back positive for e.coli. Most likely from urinary source given other negative work-up. Patient felt well after initiation of abx. Patient remained afebrile for >48 hrs. Infectious Disease was consulted and recommended transition to PO Cipro 500mg x 14 days. Patient tolerated transition to PO Cipro and feels great on day of discharge. Patient wishes to be discharged home. She has assured staff and myself that she has been in contact with her transplant team and her maintenance meds have been set-up for the rest of the year and that her home meds have been delivered 2 days ago. Patient is medically stable for discharge today with close follow up. Significant Diagnostic Studies:       Labs: Results:       Chemistry Recent Labs     11/10/18  0556 11/09/18  0508 11/08/18  0959 11/08/18  0329   GLU 95 116*  --  135*    144  --  142   K 3.8 3.0*  --  3.3*   * 114*  --  113*   CO2 23 21  --  21   BUN 17 16  --  20   CREA 0.99 1.26*  --  1.72*   CA 7.8* 7.7*  --  7.1*   AGAP 7 9  --  8   AP  --   --  57  --    TP  --   --  5.8*  --    ALB  --   --  2.5*  --    GLOB  --   --  3.3  --    AGRAT  --   --  0.8*  --       CBC w/Diff Recent Labs     11/09/18  0508 11/07/18  1929   WBC 6.9  --    RBC 3.24*  --    HGB 9.8* 9.8*   HCT 29.4*  --    *  --       Cardiac Enzymes No results for input(s): CPK, CKND1, DAYO in the last 72 hours. No lab exists for component: CKRMB, TROIP   Coagulation No results for input(s): PTP, INR, APTT in the last 72 hours.     No lab exists for component: INREXT    Lipid Panel No results found for: CHOL, CHOLPOCT, CHOLX, CHLST, CHOLV, [id number], HDL, LDL, LDLC, DLDLP, 256142, VLDLC, VLDL, TGLX, TRIGL, TRIGP, TGLPOCT, CHHD, CHHDX   BNP No results for input(s): BNPP in the last 72 hours. Liver Enzymes Recent Labs     11/08/18  0959   TP 5.8*   ALB 2.5*   AP 57   SGOT 25      Thyroid Studies No results found for: T4, T3U, TSH, TSHEXT         Discharge Exam:  Visit Vitals  /83   Pulse 62   Temp 98.2 °F (36.8 °C)   Resp 18   Ht 5' 4\" (1.626 m)   Wt 73.4 kg (161 lb 14.4 oz)   SpO2 100%   BMI 27.79 kg/m²     General appearance: alert, cooperative, no distress, appears stated age  Lungs: clear to auscultation bilaterally  Heart: regular rate and rhythm, S1, S2 normal, no murmur, click, rub or gallop  Abdomen: soft, non-tender. Bowel sounds normal. No masses,  no organomegaly  Extremities: no cyanosis or edema  Neurologic: Grossly normal    Disposition: home  Discharge Condition: stable  Patient Instructions:   Current Discharge Medication List      START taking these medications    Details   ciprofloxacin HCl (CIPRO) 500 mg tablet Take 1 Tab by mouth every twelve (12) hours. Qty: 28 Tab, Refills: 0         CONTINUE these medications which have NOT CHANGED    Details   amLODIPine (NORVASC) 10 mg tablet Take 10 mg by mouth daily. predniSONE (DELTASONE) 10 mg tablet Take  by mouth daily (with breakfast). CELLCEPT 500 mg Tab take 1,000 mg by mouth two (2) times a day. PROGRAF 1 mg Cap Take 1 mg by mouth two (2) times a day. Activity: Activity as tolerated  Diet: Regular Diet  Wound Care: None needed    Follow-up  ·  PCP in 3-5 days. Pt has ID business card in case she needs their assistance on discharge.    Time spent to discharge patient 35 minutes  Signed:  Adithya Ashton MD  11/10/2018  11:02 AM

## 2018-11-10 NOTE — PROGRESS NOTES
Patient is A/Ox4  had no complaints throughout the night  Requested to no be disturbed as frequently as the previous night so that she may rest  Patient is up adlib with no signs of distress  Will give BSR to oncoming RN

## 2018-11-11 LAB — TACROLIMUS BLD-MCNC: 19.1 NG/ML (ref 2–20)

## 2018-11-12 LAB
ANTIBODIES PERFORMED, 502264: NORMAL
CD59 CELLS BLD QL: NORMAL
CD59 DEFICIENT GRANULOCYTES NFR BLD: NORMAL %
CELL POPULATION, PNH12T: NORMAL
COMMENT, 502274: NORMAL
COMMENT, PNH8T: NORMAL
DIRECTOR REVIEW, PNHL: NORMAL
INFLUENZA A AB, IGG 828524: 1.31 IV
INFLUENZA A VIRUS IGM: 0.53 IV
INFLUENZA B VIRUS AB, IGG: 1.85 IV
INFLUENZA B VIRUS AB, IGM: 0.39 IV
MONOCYTES, 502262: NORMAL
SPECIMEN SOURCE: NORMAL
SUBMITTED DX, PNH10T: NORMAL
VIABLE CELLS NFR SPEC: NORMAL %

## 2018-11-14 LAB
BACTERIA SPEC CULT: NORMAL
SERVICE CMNT-IMP: NORMAL

## 2019-12-09 ENCOUNTER — APPOINTMENT (OUTPATIENT)
Dept: GENERAL RADIOLOGY | Age: 39
End: 2019-12-09
Attending: EMERGENCY MEDICINE
Payer: MEDICAID

## 2019-12-09 ENCOUNTER — HOSPITAL ENCOUNTER (OUTPATIENT)
Age: 39
Setting detail: OBSERVATION
Discharge: HOME | End: 2019-12-11
Attending: EMERGENCY MEDICINE | Admitting: FAMILY MEDICINE
Payer: MEDICAID

## 2019-12-09 DIAGNOSIS — R50.9 FEVER, UNSPECIFIED FEVER CAUSE: Primary | ICD-10-CM

## 2019-12-09 DIAGNOSIS — Z94.4 HISTORY OF LIVER TRANSPLANT (HCC): ICD-10-CM

## 2019-12-09 DIAGNOSIS — Z94.0 HISTORY OF KIDNEY TRANSPLANT: ICD-10-CM

## 2019-12-09 LAB
ALBUMIN SERPL-MCNC: 3.7 G/DL (ref 3.5–5)
ALBUMIN/GLOB SERPL: 0.9 {RATIO} (ref 1.2–3.5)
ALP SERPL-CCNC: 67 U/L (ref 50–136)
ALT SERPL-CCNC: 19 U/L (ref 12–65)
ANION GAP SERPL CALC-SCNC: 11 MMOL/L (ref 7–16)
APPEARANCE UR: CLEAR
AST SERPL-CCNC: 14 U/L (ref 15–37)
BACTERIA URNS QL MICRO: ABNORMAL /HPF
BASOPHILS # BLD: 0 K/UL (ref 0–0.2)
BASOPHILS NFR BLD: 0 % (ref 0–2)
BILIRUB SERPL-MCNC: 0.7 MG/DL (ref 0.2–1.1)
BILIRUB UR QL: ABNORMAL
BUN SERPL-MCNC: 13 MG/DL (ref 6–23)
CALCIUM SERPL-MCNC: 8.7 MG/DL (ref 8.3–10.4)
CHLORIDE SERPL-SCNC: 103 MMOL/L (ref 98–107)
CO2 SERPL-SCNC: 22 MMOL/L (ref 21–32)
COLOR UR: YELLOW
CREAT SERPL-MCNC: 1.11 MG/DL (ref 0.6–1)
DIFFERENTIAL METHOD BLD: ABNORMAL
EOSINOPHIL # BLD: 0.2 K/UL (ref 0–0.8)
EOSINOPHIL NFR BLD: 2 % (ref 0.5–7.8)
EPI CELLS #/AREA URNS HPF: ABNORMAL /HPF
ERYTHROCYTE [DISTWIDTH] IN BLOOD BY AUTOMATED COUNT: 12.7 % (ref 11.9–14.6)
FLUAV AG NPH QL IA: NEGATIVE
FLUBV AG NPH QL IA: NEGATIVE
GLOBULIN SER CALC-MCNC: 4.1 G/DL (ref 2.3–3.5)
GLUCOSE SERPL-MCNC: 102 MG/DL (ref 65–100)
GLUCOSE UR STRIP.AUTO-MCNC: NEGATIVE MG/DL
HCT VFR BLD AUTO: 35.6 % (ref 35.8–46.3)
HGB BLD-MCNC: 11.9 G/DL (ref 11.7–15.4)
HGB UR QL STRIP: NEGATIVE
IMM GRANULOCYTES # BLD AUTO: 0 K/UL (ref 0–0.5)
IMM GRANULOCYTES NFR BLD AUTO: 0 % (ref 0–5)
KETONES UR QL STRIP.AUTO: ABNORMAL MG/DL
LACTATE BLD-SCNC: 0.84 MMOL/L (ref 0.5–1.9)
LEUKOCYTE ESTERASE UR QL STRIP.AUTO: ABNORMAL
LIPASE SERPL-CCNC: 51 U/L (ref 73–393)
LYMPHOCYTES # BLD: 0.7 K/UL (ref 0.5–4.6)
LYMPHOCYTES NFR BLD: 8 % (ref 13–44)
MCH RBC QN AUTO: 30.1 PG (ref 26.1–32.9)
MCHC RBC AUTO-ENTMCNC: 33.4 G/DL (ref 31.4–35)
MCV RBC AUTO: 90.1 FL (ref 79.6–97.8)
MONOCYTES # BLD: 0.8 K/UL (ref 0.1–1.3)
MONOCYTES NFR BLD: 9 % (ref 4–12)
NEUTS SEG # BLD: 7.4 K/UL (ref 1.7–8.2)
NEUTS SEG NFR BLD: 81 % (ref 43–78)
NITRITE UR QL STRIP.AUTO: NEGATIVE
NRBC # BLD: 0 K/UL (ref 0–0.2)
OTHER OBSERVATIONS,UCOM: ABNORMAL
PH UR STRIP: 7 [PH] (ref 5–9)
PLATELET # BLD AUTO: 156 K/UL (ref 150–450)
PLATELET COMMENTS,PCOM: ADEQUATE
PMV BLD AUTO: 11.4 FL (ref 9.4–12.3)
POTASSIUM SERPL-SCNC: 3.9 MMOL/L (ref 3.5–5.1)
PROCALCITONIN SERPL-MCNC: 0.14 NG/ML
PROT SERPL-MCNC: 7.8 G/DL (ref 6.3–8.2)
PROT UR STRIP-MCNC: 100 MG/DL
RBC # BLD AUTO: 3.95 M/UL (ref 4.05–5.2)
RBC #/AREA URNS HPF: ABNORMAL /HPF
RBC MORPH BLD: ABNORMAL
SODIUM SERPL-SCNC: 136 MMOL/L (ref 136–145)
SP GR UR REFRACTOMETRY: 1.02 (ref 1–1.02)
SPECIMEN SOURCE: NORMAL
UROBILINOGEN UR QL STRIP.AUTO: 0.2 EU/DL (ref 0.2–1)
WBC # BLD AUTO: 9.1 K/UL (ref 4.3–11.1)
WBC MORPH BLD: ABNORMAL
WBC URNS QL MICRO: ABNORMAL /HPF

## 2019-12-09 PROCEDURE — 87086 URINE CULTURE/COLONY COUNT: CPT

## 2019-12-09 PROCEDURE — 80053 COMPREHEN METABOLIC PANEL: CPT

## 2019-12-09 PROCEDURE — 99285 EMERGENCY DEPT VISIT HI MDM: CPT | Performed by: EMERGENCY MEDICINE

## 2019-12-09 PROCEDURE — 96361 HYDRATE IV INFUSION ADD-ON: CPT

## 2019-12-09 PROCEDURE — 71046 X-RAY EXAM CHEST 2 VIEWS: CPT

## 2019-12-09 PROCEDURE — 74011000258 HC RX REV CODE- 258: Performed by: EMERGENCY MEDICINE

## 2019-12-09 PROCEDURE — 84145 PROCALCITONIN (PCT): CPT

## 2019-12-09 PROCEDURE — 96365 THER/PROPH/DIAG IV INF INIT: CPT | Performed by: EMERGENCY MEDICINE

## 2019-12-09 PROCEDURE — 74011000250 HC RX REV CODE- 250: Performed by: EMERGENCY MEDICINE

## 2019-12-09 PROCEDURE — 83690 ASSAY OF LIPASE: CPT

## 2019-12-09 PROCEDURE — 74011250637 HC RX REV CODE- 250/637: Performed by: EMERGENCY MEDICINE

## 2019-12-09 PROCEDURE — 87804 INFLUENZA ASSAY W/OPTIC: CPT

## 2019-12-09 PROCEDURE — 83605 ASSAY OF LACTIC ACID: CPT

## 2019-12-09 PROCEDURE — 74011250636 HC RX REV CODE- 250/636: Performed by: EMERGENCY MEDICINE

## 2019-12-09 PROCEDURE — 96375 TX/PRO/DX INJ NEW DRUG ADDON: CPT | Performed by: EMERGENCY MEDICINE

## 2019-12-09 PROCEDURE — 81003 URINALYSIS AUTO W/O SCOPE: CPT

## 2019-12-09 PROCEDURE — 80197 ASSAY OF TACROLIMUS: CPT

## 2019-12-09 PROCEDURE — 96361 HYDRATE IV INFUSION ADD-ON: CPT | Performed by: EMERGENCY MEDICINE

## 2019-12-09 PROCEDURE — 99218 HC RM OBSERVATION: CPT

## 2019-12-09 PROCEDURE — 87040 BLOOD CULTURE FOR BACTERIA: CPT

## 2019-12-09 PROCEDURE — 85025 COMPLETE CBC W/AUTO DIFF WBC: CPT

## 2019-12-09 RX ORDER — ACETAMINOPHEN 500 MG
1000 TABLET ORAL
Status: COMPLETED | OUTPATIENT
Start: 2019-12-09 | End: 2019-12-09

## 2019-12-09 RX ADMIN — SODIUM CHLORIDE 1000 ML: 900 INJECTION, SOLUTION INTRAVENOUS at 21:32

## 2019-12-09 RX ADMIN — CEFTRIAXONE 1 G: 1 INJECTION, POWDER, FOR SOLUTION INTRAMUSCULAR; INTRAVENOUS at 21:33

## 2019-12-09 RX ADMIN — ACETAMINOPHEN 1000 MG: 500 TABLET, FILM COATED ORAL at 21:25

## 2019-12-09 RX ADMIN — PROMETHAZINE HYDROCHLORIDE 12.5 MG: 25 INJECTION INTRAMUSCULAR; INTRAVENOUS at 21:24

## 2019-12-09 NOTE — ED TRIAGE NOTES
Pt states fever and vomiting for 24 hours. States she has had a kidney and liver transplant July 18. 2017. Pt states abd pain that started last night and got worse this afternoon. States nausea vomiting, denies diarrhea. Pt had her first liver transplant in 2002. This liver failed/ was rejected in 2017 and caused her kidney to fail as well. Sees transplant doctors at [address]. NAD at this time. Spoke with charge nurse for room placement.

## 2019-12-10 PROBLEM — N39.0 UTI (URINARY TRACT INFECTION): Status: ACTIVE | Noted: 2019-12-10

## 2019-12-10 LAB
ANION GAP SERPL CALC-SCNC: 8 MMOL/L (ref 7–16)
BASOPHILS # BLD: 0 K/UL (ref 0–0.2)
BASOPHILS NFR BLD: 0 % (ref 0–2)
BUN SERPL-MCNC: 11 MG/DL (ref 6–23)
CALCIUM SERPL-MCNC: 7.8 MG/DL (ref 8.3–10.4)
CHLORIDE SERPL-SCNC: 108 MMOL/L (ref 98–107)
CO2 SERPL-SCNC: 22 MMOL/L (ref 21–32)
CREAT SERPL-MCNC: 1.02 MG/DL (ref 0.6–1)
DIFFERENTIAL METHOD BLD: ABNORMAL
EOSINOPHIL # BLD: 0 K/UL (ref 0–0.8)
EOSINOPHIL NFR BLD: 0 % (ref 0.5–7.8)
ERYTHROCYTE [DISTWIDTH] IN BLOOD BY AUTOMATED COUNT: 12.9 % (ref 11.9–14.6)
GLUCOSE SERPL-MCNC: 134 MG/DL (ref 65–100)
HCT VFR BLD AUTO: 31.3 % (ref 35.8–46.3)
HGB BLD-MCNC: 10.5 G/DL (ref 11.7–15.4)
IMM GRANULOCYTES # BLD AUTO: 0 K/UL (ref 0–0.5)
IMM GRANULOCYTES NFR BLD AUTO: 0 % (ref 0–5)
LYMPHOCYTES # BLD: 0.9 K/UL (ref 0.5–4.6)
LYMPHOCYTES NFR BLD: 17 % (ref 13–44)
MCH RBC QN AUTO: 30.5 PG (ref 26.1–32.9)
MCHC RBC AUTO-ENTMCNC: 33.5 G/DL (ref 31.4–35)
MCV RBC AUTO: 91 FL (ref 79.6–97.8)
MONOCYTES # BLD: 0.7 K/UL (ref 0.1–1.3)
MONOCYTES NFR BLD: 13 % (ref 4–12)
NEUTS SEG # BLD: 3.7 K/UL (ref 1.7–8.2)
NEUTS SEG NFR BLD: 69 % (ref 43–78)
NRBC # BLD: 0 K/UL (ref 0–0.2)
PLATELET # BLD AUTO: 98 K/UL (ref 150–450)
PMV BLD AUTO: 11.9 FL (ref 9.4–12.3)
POTASSIUM SERPL-SCNC: 3.5 MMOL/L (ref 3.5–5.1)
RBC # BLD AUTO: 3.44 M/UL (ref 4.05–5.2)
SODIUM SERPL-SCNC: 138 MMOL/L (ref 136–145)
WBC # BLD AUTO: 5.3 K/UL (ref 4.3–11.1)

## 2019-12-10 PROCEDURE — 99218 HC RM OBSERVATION: CPT

## 2019-12-10 PROCEDURE — 74011250636 HC RX REV CODE- 250/636: Performed by: FAMILY MEDICINE

## 2019-12-10 PROCEDURE — 96366 THER/PROPH/DIAG IV INF ADDON: CPT

## 2019-12-10 PROCEDURE — 36415 COLL VENOUS BLD VENIPUNCTURE: CPT

## 2019-12-10 PROCEDURE — 85025 COMPLETE CBC W/AUTO DIFF WBC: CPT

## 2019-12-10 PROCEDURE — 77030020263 HC SOL INJ SOD CL0.9% LFCR 1000ML

## 2019-12-10 PROCEDURE — 74011000258 HC RX REV CODE- 258: Performed by: FAMILY MEDICINE

## 2019-12-10 PROCEDURE — 77030040361 HC SLV COMPR DVT MDII -B

## 2019-12-10 PROCEDURE — 80048 BASIC METABOLIC PNL TOTAL CA: CPT

## 2019-12-10 PROCEDURE — 74011250637 HC RX REV CODE- 250/637: Performed by: FAMILY MEDICINE

## 2019-12-10 PROCEDURE — 96361 HYDRATE IV INFUSION ADD-ON: CPT

## 2019-12-10 RX ORDER — AMLODIPINE BESYLATE 10 MG/1
10 TABLET ORAL DAILY
Status: DISCONTINUED | OUTPATIENT
Start: 2019-12-10 | End: 2019-12-11 | Stop reason: HOSPADM

## 2019-12-10 RX ORDER — SODIUM CHLORIDE 9 MG/ML
125 INJECTION, SOLUTION INTRAVENOUS CONTINUOUS
Status: DISCONTINUED | OUTPATIENT
Start: 2019-12-10 | End: 2019-12-11 | Stop reason: HOSPADM

## 2019-12-10 RX ORDER — SODIUM CHLORIDE 0.9 % (FLUSH) 0.9 %
5-40 SYRINGE (ML) INJECTION AS NEEDED
Status: DISCONTINUED | OUTPATIENT
Start: 2019-12-10 | End: 2019-12-11 | Stop reason: HOSPADM

## 2019-12-10 RX ORDER — SODIUM CHLORIDE 0.9 % (FLUSH) 0.9 %
5-40 SYRINGE (ML) INJECTION EVERY 8 HOURS
Status: DISCONTINUED | OUTPATIENT
Start: 2019-12-10 | End: 2019-12-11 | Stop reason: HOSPADM

## 2019-12-10 RX ORDER — MYCOPHENOLATE MOFETIL 500 MG/1
1000 TABLET ORAL 2 TIMES DAILY
Status: DISCONTINUED | OUTPATIENT
Start: 2019-12-10 | End: 2019-12-11 | Stop reason: HOSPADM

## 2019-12-10 RX ORDER — ONDANSETRON 2 MG/ML
4 INJECTION INTRAMUSCULAR; INTRAVENOUS
Status: DISCONTINUED | OUTPATIENT
Start: 2019-12-10 | End: 2019-12-11

## 2019-12-10 RX ORDER — ACETAMINOPHEN 325 MG/1
650 TABLET ORAL
Status: DISCONTINUED | OUTPATIENT
Start: 2019-12-10 | End: 2019-12-11 | Stop reason: HOSPADM

## 2019-12-10 RX ORDER — TACROLIMUS 1 MG/1
1 CAPSULE ORAL 2 TIMES DAILY
Status: DISCONTINUED | OUTPATIENT
Start: 2019-12-10 | End: 2019-12-11 | Stop reason: HOSPADM

## 2019-12-10 RX ADMIN — Medication 10 ML: at 05:10

## 2019-12-10 RX ADMIN — Medication 10 ML: at 21:44

## 2019-12-10 RX ADMIN — SODIUM CHLORIDE 125 ML/HR: 900 INJECTION, SOLUTION INTRAVENOUS at 10:01

## 2019-12-10 RX ADMIN — ACETAMINOPHEN 650 MG: 325 TABLET, FILM COATED ORAL at 05:33

## 2019-12-10 RX ADMIN — AMLODIPINE BESYLATE 10 MG: 10 TABLET ORAL at 08:33

## 2019-12-10 RX ADMIN — ACETAMINOPHEN 650 MG: 325 TABLET, FILM COATED ORAL at 19:55

## 2019-12-10 RX ADMIN — CEFTRIAXONE 1 G: 1 INJECTION, POWDER, FOR SOLUTION INTRAMUSCULAR; INTRAVENOUS at 19:56

## 2019-12-10 RX ADMIN — SODIUM CHLORIDE 125 ML/HR: 900 INJECTION, SOLUTION INTRAVENOUS at 01:46

## 2019-12-10 RX ADMIN — TACROLIMUS 1 MG: 1 CAPSULE ORAL at 18:13

## 2019-12-10 RX ADMIN — Medication 10 ML: at 02:00

## 2019-12-10 RX ADMIN — Medication 10 ML: at 01:49

## 2019-12-10 RX ADMIN — TACROLIMUS 1 MG: 1 CAPSULE ORAL at 08:33

## 2019-12-10 RX ADMIN — ACETAMINOPHEN 650 MG: 325 TABLET, FILM COATED ORAL at 12:46

## 2019-12-10 RX ADMIN — MYCOPHENOLATE MOFETIL 1000 MG: 500 TABLET, FILM COATED ORAL at 18:13

## 2019-12-10 RX ADMIN — MYCOPHENOLATE MOFETIL 1000 MG: 500 TABLET, FILM COATED ORAL at 08:33

## 2019-12-10 NOTE — PROGRESS NOTES
TRANSFER - IN REPORT:    Verbal report received from Gurpreet Bustamante RN on [address]  being received from ER for routine progression of care      Report consisted of patients Situation, Background, Assessment and   Recommendations(SBAR). Information from the following report(s) SBAR, Kardex, ED Summary, [facility] and Recent Results was reviewed with the receiving nurse. Opportunity for questions and clarification was provided. Assessment to be completed upon patients arrival to unit and care assumed.

## 2019-12-10 NOTE — PROGRESS NOTES
Patient oral temp is 102.5. Patient was medicated with tylenol for headache approximately 1 hour ago. Dr. Layne Scruggs at bedside and aware of current temperature.

## 2019-12-10 NOTE — PROGRESS NOTES
SBAR received from [address]. Patient remains in stable condition with respirations even/unlabored. No acute distress noted at this time. Patient on room air. Call light remains within reach, patient encouraged to call nurse prn assist. Will continue to monitor per policy.

## 2019-12-10 NOTE — H&P
HOSPITALIST H&P/CONSULT  NAME:  Jf Aviles   Age:  35 y.o.  :   1980   MRN:   [id number]  PCP: Corey Rockwell MD  Consulting MD:  Treatment Team: Attending Provider: Hartwell Bamberger, MD; Primary Nurse: Jacki Rosado  HPI:   Patient is a 40yoF with PMH significant for renal and liver transplant who presents with fever, nausea, vomiting. Reports that it started yesterday. Reports myalgias. States that she has had a cough, but it is only associated with after she vomits. Denies abdominal pain, constipation, diarrhea. Patient has measured fever in the ER. UA shows mild UTI. Hospitalist to admit for IV antibiotics given ER concern about immunocompromised status. Complete ROS done and is as stated in HPI or otherwise negative. Past Medical History:   Diagnosis Date    Diabetes ([address]     Infectious disease     hepatitis c    Liver disease     transplant , liver and kidney transplant 2017    Past Medical History reviewed. Past Surgical History:   Procedure Laterality Date    HX  SECTION      HX HERNIA REPAIR      HX LIVER TRANSPLANT      10/7/02 and 17    HX TRANSPLANT      liver      Prior to Admission Medications   Prescriptions Last Dose Informant Patient Reported? Taking? CELLCEPT 500 mg Tab   Yes No   Sig: take 1,000 mg by mouth two (2) times a day. PROGRAF 1 mg Cap   Yes No   Sig: Take 1 mg by mouth two (2) times a day. amLODIPine (NORVASC) 10 mg tablet   Yes No   Sig: Take 10 mg by mouth daily. ciprofloxacin HCl (CIPRO) 500 mg tablet   No No   Sig: Take 1 Tab by mouth every twelve (12) hours. predniSONE (DELTASONE) 10 mg tablet   Yes No   Sig: Take  by mouth daily (with breakfast).       Facility-Administered Medications: None     No Known Allergies   Social History     Tobacco Use    Smoking status: Never Smoker    Smokeless tobacco: Never Used   Substance Use Topics    Alcohol use: No      Family History   Problem Relation Age of Onset    Cancer Mother     Hypertension Father     Stroke Father     Hypertension Sister     Family History reviewed and is non-contributory to current presentation. Objective:     Visit Vitals  /77   Pulse 92   Temp 99.4 °F (37.4 °C)   Resp 20   Ht 5' 4\" (1.626 m)   Wt 74.8 kg (165 lb)   SpO2 99%   BMI 28.32 kg/m²      Temp (24hrs), Av.8 °F (37.7 °C), Min:98.7 °F (37.1 °C), Max:101.5 °F (38.6 °C)    Oxygen Therapy  O2 Sat (%): 99 % (19)  Pulse via Oximetry: 96 beats per minute (191)  O2 Device: Room air (19)  Physical Exam:  General:    Alert, cooperative, no distress, appears stated age. Head:   Normocephalic, without obvious abnormality, atraumatic. Nose:  Nares normal. No drainage or sinus tenderness. Lungs:   Clear to auscultation bilaterally. No Wheezing or Rhonchi. No rales. Heart:   Regular rate and rhythm, no murmur, rub or gallop. Abdomen:   Soft, non-tender. Not distended. Bowel sounds normal.   Extremities: No cyanosis. No edema. No clubbing. Skin:     Texture, turgor normal.  Not Jaundiced. Neurologic: Alert and oriented x 3, no focal deficits. Data Review:   Recent Results (from the past 24 hour(s))   CBC WITH AUTOMATED DIFF    Collection Time: 19  6:38 PM   Result Value Ref Range    WBC 9.1 4.3 - 11.1 K/uL    RBC 3.95 (L) 4.05 - 5.2 M/uL    HGB 11.9 11.7 - 15.4 g/dL    HCT 35.6 (L) 35.8 - 46.3 %    MCV 90.1 79.6 - 97.8 FL    MCH 30.1 26.1 - 32.9 PG    MCHC 33.4 31.4 - 35.0 g/dL    RDW 12.7 11.9 - 14.6 %    PLATELET 406 948 - 033 K/uL    MPV 11.4 9.4 - 12.3 FL    ABSOLUTE NRBC 0.00 0.0 - 0.2 K/uL    NEUTROPHILS 81 (H) 43 - 78 %    LYMPHOCYTES 8 (L) 13 - 44 %    MONOCYTES 9 4.0 - 12.0 %    EOSINOPHILS 2 0.5 - 7.8 %    BASOPHILS 0 0.0 - 2.0 %    IMMATURE GRANULOCYTES 0 0.0 - 5.0 %    ABS. NEUTROPHILS 7.4 1.7 - 8.2 K/UL    ABS. LYMPHOCYTES 0.7 0.5 - 4.6 K/UL    ABS. MONOCYTES 0.8 0.1 - 1.3 K/UL    ABS. EOSINOPHILS 0.2 0.0 - 0.8 K/UL    ABS. BASOPHILS 0.0 0.0 - 0.2 K/UL    ABS. IMM. GRANS. 0.0 0.0 - 0.5 K/UL    RBC COMMENTS NORMOCYTIC/NORMOCHROMIC      WBC COMMENTS Result Confirmed By Smear      PLATELET COMMENTS ADEQUATE      DF AUTOMATED     METABOLIC PANEL, COMPREHENSIVE    Collection Time: 12/09/19  6:38 PM   Result Value Ref Range    Sodium 136 136 - 145 mmol/L    Potassium 3.9 3.5 - 5.1 mmol/L    Chloride 103 98 - 107 mmol/L    CO2 22 21 - 32 mmol/L    Anion gap 11 7 - 16 mmol/L    Glucose 102 (H) 65 - 100 mg/dL    BUN 13 6 - 23 MG/DL    Creatinine 1.11 (H) 0.6 - 1.0 MG/DL    GFR est AA >60 >60 ml/min/1.73m2    GFR est non-AA 58 (L) >60 ml/min/1.73m2    Calcium 8.7 8.3 - 10.4 MG/DL    Bilirubin, total 0.7 0.2 - 1.1 MG/DL    ALT (SGPT) 19 12 - 65 U/L    AST (SGOT) 14 (L) 15 - 37 U/L    Alk.  phosphatase 67 50 - 136 U/L    Protein, total 7.8 6.3 - 8.2 g/dL    Albumin 3.7 3.5 - 5.0 g/dL    Globulin 4.1 (H) 2.3 - 3.5 g/dL    A-G Ratio 0.9 (L) 1.2 - 3.5     PROCALCITONIN    Collection Time: 12/09/19  6:38 PM   Result Value Ref Range    Procalcitonin 0.14 ng/mL   LIPASE    Collection Time: 12/09/19  6:38 PM   Result Value Ref Range    Lipase 51 (L) 73 - 393 U/L   POC LACTIC ACID    Collection Time: 12/09/19  6:43 PM   Result Value Ref Range    Lactic Acid (POC) 0.84 0.5 - 1.9 mmol/L   URINALYSIS W/ RFLX MICROSCOPIC    Collection Time: 12/09/19  7:04 PM   Result Value Ref Range    Color YELLOW      Appearance CLEAR      Specific gravity 1.019 1.001 - 1.023      pH (UA) 7.0 5.0 - 9.0      Protein 100 (A) NEG mg/dL    Glucose NEGATIVE  NEG mg/dL    Ketone GREATER THAN/EQUAL TO 80 (A) NEG mg/dL    Bilirubin SMALL (A) NEG      Blood NEGATIVE  NEG      Urobilinogen 0.2 0.2 - 1.0 EU/dL    Nitrites NEGATIVE  NEG      Leukocyte Esterase TRACE (A) NEG      WBC 5-10 0 /hpf    RBC 0-3 0 /hpf    Epithelial cells 0-3 0 /hpf    Bacteria TRACE 0 /hpf    Other observations RESULTS VERIFIED MANUALLY     INFLUENZA A & B AG (RAPID TEST)    Collection Time: 12/09/19  9:30 PM Result Value Ref Range    Influenza A Ag NEGATIVE  NEG      Influenza B Ag NEGATIVE  NEG      Source NASOPHARYNGEAL       Imaging /Procedures /Studies     Assessment and Plan:      Active Hospital Problems    Diagnosis Date Noted    Fever 11/07/2018    HTN (hypertension) 11/07/2018    History of kidney transplant 10/31/2017    History of liver transplant ([facility] 05/28/2017       PLAN  UTI  - Mild, with UA showing trace bacteria and 5-10 WBC, but given fever and no other source, will continue rocephin  - Blood cx obtained    HTN  - BP controlled    Kidney and Liver Transplant  - Home meds         Anticipated discharge: 1-2 days, pending clinical course    Signed By: Blu Koch MD     December 10, 2019

## 2019-12-10 NOTE — ED NOTES
TRANSFER - OUT REPORT:    Verbal report given to [facility] RN on [address]  being transferred to [facility] for routine progression of care       Report consisted of patients Situation, Background, Assessment and   Recommendations(SBAR). Information from the following report(s) SBAR, ED Summary and MAR was reviewed with the receiving nurse. Lines:   Peripheral IV 12/09/19 Right Forearm (Active)   Site Assessment Clean, dry, & intact 12/9/2019  6:39 PM   Phlebitis Assessment 0 12/9/2019  6:39 PM   Infiltration Assessment 0 12/9/2019  6:39 PM   Dressing Status Clean, dry, & intact 12/9/2019  6:39 PM   Hub Color/Line Status Pink 12/9/2019  6:39 PM        Opportunity for questions and clarification was provided.       Patient transported with:   [employer]

## 2019-12-10 NOTE — PROGRESS NOTES
Pt admitted to room 815 via stretcher with transport. On RA. RR even/unlabored. NAD noted. Oriented to room. Safety measures in place. Call light within reach. Dual skin assessment completed with Sergio Chan RN. Pt with no open wounds or pressure injuries. Scars noted from prior surgeries. Will continue to monitor.

## 2019-12-10 NOTE — ED PROVIDER NOTES
50-year-old female with history of autoimmune hepatitis, diabetes, history of kidney and liver transplant in  presents with complaint of fever, nausea, vomiting for the past 24 hours. Reports diffuse body aches over the same time period. States she is been having productive cough with yellow sputum as well. Denies diarrhea, melena, hematochezia, constipation, headache, neck pain, sore throat. She is on chronic steroid, CellCept, Prograf. The history is provided by the patient. No  was used. Fever    This is a new problem. The current episode started 12 to 24 hours ago. The problem occurs constantly. The problem has not changed since onset. The maximum temperature noted was 101 - 101.9 F. The temperature was taken using an oral thermometer. Associated symptoms include vomiting, congestion, muscle aches and cough. Pertinent negatives include no chest pain, no sleepiness, no diarrhea, no headaches, no sore throat, no shortness of breath, no mental status change, no neck pain and no rash. She has tried nothing for the symptoms. The treatment provided no relief.         Past Medical History:   Diagnosis Date    Diabetes ([facility] [address]     Infectious disease     hepatitis c    Liver disease     transplant , liver and kidney transplant 2017       Past Surgical History:   Procedure Laterality Date    HX  SECTION      HX HERNIA REPAIR      HX LIVER TRANSPLANT      10/7/02 and 17    HX TRANSPLANT      liver         Family History:   Problem Relation Age of Onset    Cancer Mother     Hypertension Father     Stroke Father     Hypertension Sister        Social History     Socioeconomic History    Marital status: SINGLE     Spouse name: Not on file    Number of children: Not on file    Years of education: Not on file    Highest education level: Not on file   Occupational History    Not on file   Social Needs    Financial resource strain: Not on file    Food insecurity: Worry: Not on file     Inability: Not on file    Transportation needs:     Medical: Not on file     Non-medical: Not on file   Tobacco Use    Smoking status: Never Smoker    Smokeless tobacco: Never Used   Substance and Sexual Activity    Alcohol use: No    Drug use: No    Sexual activity: Yes     Partners: Male     Birth control/protection: None   Lifestyle    Physical activity:     Days per week: Not on file     Minutes per session: Not on file    Stress: Not on file   Relationships    Social connections:     Talks on phone: Not on file     Gets together: Not on file     Attends [employer] service: Not on file     Active member of club or organization: Not on file     Attends meetings of clubs or organizations: Not on file     Relationship status: Not on file    Intimate partner violence:     Fear of current or ex partner: Not on file     Emotionally abused: Not on file     Physically abused: Not on file     Forced sexual activity: Not on file   Other Topics Concern    Not on file   Social History Narrative    Not on file         ALLERGIES: Patient has no known allergies. Review of Systems   Constitutional: Positive for chills and fever. Negative for diaphoresis and fatigue. HENT: Positive for congestion. Negative for sinus pain, sore throat and voice change. Respiratory: Positive for cough. Negative for shortness of breath. Cardiovascular: Negative for chest pain and palpitations. Gastrointestinal: Positive for vomiting. Negative for abdominal pain, anal bleeding, blood in stool, constipation and diarrhea. Genitourinary: Negative for dysuria and flank pain. Musculoskeletal: Positive for myalgias. Negative for arthralgias, neck pain and neck stiffness. Skin: Negative for rash and wound. Neurological: Negative for dizziness, tremors, speech difficulty, weakness, light-headedness and headaches.        Vitals:    12/09/19 1832 12/09/19 1853 12/09/19 1904 12/09/19 1906   BP: (!) 142/93 (!) 136/91 127/88 127/88   Pulse: (!) 111   98   Resp: 16   24   Temp: (!) 101.5 °F (38.6 °C)   99.6 °F (37.6 °C)   SpO2: 100%   100%   Weight: 74.8 kg (165 lb)      Height: 5' 4\" (1.626 m)               Physical Exam  Vitals signs and nursing note reviewed. Constitutional:       Appearance: She is well-developed. Comments: Well appearing and in NAD. HENT:      Head: Normocephalic. Eyes:      Conjunctiva/sclera: Conjunctivae normal.      Pupils: Pupils are equal, round, and reactive to light. Neck:      Vascular: No JVD. Trachea: No tracheal deviation. Cardiovascular:      Rate and Rhythm: Normal rate and regular rhythm. Heart sounds: Normal heart sounds. Comments: RRR. Pulses 2+ and equal bilaterally. Pulmonary:      Effort: Pulmonary effort is normal.      Breath sounds: Normal breath sounds. Abdominal:      General: Bowel sounds are normal.      Palpations: Abdomen is soft. Tenderness: There is no tenderness. There is no right CVA tenderness, left CVA tenderness or guarding. Comments: Soft, NTND. No rebound or guarding. No CVAT. Musculoskeletal: Normal range of motion. Skin:     General: Skin is warm and dry. Comments: No rash. Neurological:      General: No focal deficit present. Mental Status: She is alert and oriented to person, place, and time. Cranial Nerves: No cranial nerve deficit. Comments: Strength 5/5 throughout. Normal sensory. No meningeal signs          MDM  Number of Diagnoses or Management Options  Fever, unspecified fever cause: new and requires workup  History of kidney transplant: new and requires workup  History of liver transplant [facility]): new and requires workup  Diagnosis management comments: Creatinine slightly increased from previous; Cr 1.11; prev 0.99 in 2018. UA with a with mild UTI. Large ketones present. Order placed for urine culture. Patient given 1 L normal saline IV fluid bolus, Rocephin 1 g IV. CXR clear. Flu pending. Given significant PMHx Hospitalist consulted for admission. Amount and/or Complexity of Data Reviewed  Clinical lab tests: ordered and reviewed  Tests in the radiology section of CPT®: reviewed and ordered  Tests in the medicine section of CPT®: ordered and reviewed  Review and summarize past medical records: yes  Independent visualization of images, tracings, or specimens: yes    Risk of Complications, Morbidity, and/or Mortality  Presenting problems: moderate  Diagnostic procedures: moderate  Management options: moderate    Patient Progress  Patient progress: stable    ED Course as of Dec 09 2154   Mon Dec 09, 2019   2112 CXR IMPRESSION: Normal chest x-ray. [DF]      ED Course User Index  [DF] Cristela Favre., MD       Procedures    Results Include:    Recent Results (from the past 24 hour(s))   CBC WITH AUTOMATED DIFF    Collection Time: 12/09/19  6:38 PM   Result Value Ref Range    WBC 9.1 4.3 - 11.1 K/uL    RBC 3.95 (L) 4.05 - 5.2 M/uL    HGB 11.9 11.7 - 15.4 g/dL    HCT 35.6 (L) 35.8 - 46.3 %    MCV 90.1 79.6 - 97.8 FL    MCH 30.1 26.1 - 32.9 PG    MCHC 33.4 31.4 - 35.0 g/dL    RDW 12.7 11.9 - 14.6 %    PLATELET 783 999 - 285 K/uL    MPV 11.4 9.4 - 12.3 FL    ABSOLUTE NRBC 0.00 0.0 - 0.2 K/uL    NEUTROPHILS 81 (H) 43 - 78 %    LYMPHOCYTES 8 (L) 13 - 44 %    MONOCYTES 9 4.0 - 12.0 %    EOSINOPHILS 2 0.5 - 7.8 %    BASOPHILS 0 0.0 - 2.0 %    IMMATURE GRANULOCYTES 0 0.0 - 5.0 %    ABS. NEUTROPHILS 7.4 1.7 - 8.2 K/UL    ABS. LYMPHOCYTES 0.7 0.5 - 4.6 K/UL    ABS. MONOCYTES 0.8 0.1 - 1.3 K/UL    ABS. EOSINOPHILS 0.2 0.0 - 0.8 K/UL    ABS. BASOPHILS 0.0 0.0 - 0.2 K/UL    ABS. IMM.  GRANS. 0.0 0.0 - 0.5 K/UL    RBC COMMENTS NORMOCYTIC/NORMOCHROMIC      WBC COMMENTS Result Confirmed By Smear      PLATELET COMMENTS ADEQUATE      DF AUTOMATED     METABOLIC PANEL, COMPREHENSIVE    Collection Time: 12/09/19  6:38 PM   Result Value Ref Range    Sodium 136 136 - 145 mmol/L    Potassium 3.9 3.5 - 5.1 mmol/L    Chloride 103 98 - 107 mmol/L    CO2 22 21 - 32 mmol/L    Anion gap 11 7 - 16 mmol/L    Glucose 102 (H) 65 - 100 mg/dL    BUN 13 6 - 23 MG/DL    Creatinine 1.11 (H) 0.6 - 1.0 MG/DL    GFR est AA >60 >60 ml/min/1.73m2    GFR est non-AA 58 (L) >60 ml/min/1.73m2    Calcium 8.7 8.3 - 10.4 MG/DL    Bilirubin, total 0.7 0.2 - 1.1 MG/DL    ALT (SGPT) 19 12 - 65 U/L    AST (SGOT) 14 (L) 15 - 37 U/L    Alk. phosphatase 67 50 - 136 U/L    Protein, total 7.8 6.3 - 8.2 g/dL    Albumin 3.7 3.5 - 5.0 g/dL    Globulin 4.1 (H) 2.3 - 3.5 g/dL    A-G Ratio 0.9 (L) 1.2 - 3.5     PROCALCITONIN    Collection Time: 12/09/19  6:38 PM   Result Value Ref Range    Procalcitonin 0.14 ng/mL   POC LACTIC ACID    Collection Time: 12/09/19  6:43 PM   Result Value Ref Range    Lactic Acid (POC) 0.84 0.5 - 1.9 mmol/L   URINALYSIS W/ RFLX MICROSCOPIC    Collection Time: 12/09/19  7:04 PM   Result Value Ref Range    Color YELLOW      Appearance CLEAR      Specific gravity 1.019 1.001 - 1.023      pH (UA) 7.0 5.0 - 9.0      Protein 100 (A) NEG mg/dL    Glucose NEGATIVE  NEG mg/dL    Ketone GREATER THAN/EQUAL TO 80 (A) NEG mg/dL    Bilirubin SMALL (A) NEG      Blood NEGATIVE  NEG      Urobilinogen 0.2 0.2 - 1.0 EU/dL    Nitrites NEGATIVE  NEG      Leukocyte Esterase TRACE (A) NEG      WBC 5-10 0 /hpf    RBC 0-3 0 /hpf    Epithelial cells 0-3 0 /hpf    Bacteria TRACE 0 /hpf    Other observations RESULTS VERIFIED MANUALLY           Joe Worrell MD; 12/9/2019 @8:45 PM Voice dictation software was used during the making of this note. This software is not perfect and grammatical and other typographical errors may be present.   This note has not been proofread for errors.  ===================================================================

## 2019-12-10 NOTE — PROGRESS NOTES
Patient remains in stable condition with respirations even/unlabored. No acute distress noted. No needs noted or voiced at this time. Safety measures in place. IVF infusing at ordered rate. Call light remains within reach. Preparing to give report to oncoming shift.

## 2019-12-10 NOTE — PROGRESS NOTES
Pt resting in bed. On RA. RR even/unlabored. NAD noted. Temp rechecked and is currently 100.4. Safety measures in place. Call light within reach. Preparing to give report to oncoming RN.

## 2019-12-10 NOTE — PROGRESS NOTES
MSN, CM:  Spoke with patient this AM about discharge planning. Patient lives in own home with [address] 8 y/o son with three steps to entrance. Patient has family that lives in the area if assistance is needed. Patient is independent with all ADL's and requires no equipment for ambulation. Patient drives herself to all appointments. Patient states she sees her PCP Dr. Nicole Colon on a regular basis. There are no Case Management needs at this time but Case Management will continue to follow for any discharge needs. Care Management Interventions  PCP Verified by CM: Yes(Dr. Nicole Colon)  Mode of Transport at Discharge:  Other (see comment)(family)  Transition of Care Consult (CM Consult): Discharge Planning  Discharge Durable Medical Equipment: No  Physical Therapy Consult: No  Occupational Therapy Consult: No  Speech Therapy Consult: No  Current Support Network: Own Home, Other(19 y/o son)  Confirm Follow Up Transport: Family  Plan discussed with Pt/Family/Caregiver: Yes  Freedom of Choice Offered: Yes   Resource Information Provided?: No  Discharge Location  Discharge Placement: Home

## 2019-12-11 VITALS
RESPIRATION RATE: 20 BRPM | SYSTOLIC BLOOD PRESSURE: 106 MMHG | DIASTOLIC BLOOD PRESSURE: 76 MMHG | HEART RATE: 103 BPM | TEMPERATURE: 98.3 F | WEIGHT: 161.5 LBS | OXYGEN SATURATION: 96 % | HEIGHT: 64 IN | BODY MASS INDEX: 27.57 KG/M2

## 2019-12-11 LAB
ANION GAP SERPL CALC-SCNC: 7 MMOL/L (ref 7–16)
BASOPHILS # BLD: 0 K/UL (ref 0–0.2)
BASOPHILS NFR BLD: 0 % (ref 0–2)
BUN SERPL-MCNC: 7 MG/DL (ref 6–23)
CALCIUM SERPL-MCNC: 7.7 MG/DL (ref 8.3–10.4)
CHLORIDE SERPL-SCNC: 112 MMOL/L (ref 98–107)
CO2 SERPL-SCNC: 23 MMOL/L (ref 21–32)
CREAT SERPL-MCNC: 0.87 MG/DL (ref 0.6–1)
DIFFERENTIAL METHOD BLD: ABNORMAL
EOSINOPHIL # BLD: 0 K/UL (ref 0–0.8)
EOSINOPHIL NFR BLD: 1 % (ref 0.5–7.8)
ERYTHROCYTE [DISTWIDTH] IN BLOOD BY AUTOMATED COUNT: 13.2 % (ref 11.9–14.6)
GLUCOSE SERPL-MCNC: 115 MG/DL (ref 65–100)
HCT VFR BLD AUTO: 32.1 % (ref 35.8–46.3)
HGB BLD-MCNC: 10.7 G/DL (ref 11.7–15.4)
IMM GRANULOCYTES # BLD AUTO: 0 K/UL (ref 0–0.5)
IMM GRANULOCYTES NFR BLD AUTO: 0 % (ref 0–5)
LYMPHOCYTES # BLD: 1.2 K/UL (ref 0.5–4.6)
LYMPHOCYTES NFR BLD: 23 % (ref 13–44)
MCH RBC QN AUTO: 30.4 PG (ref 26.1–32.9)
MCHC RBC AUTO-ENTMCNC: 33.3 G/DL (ref 31.4–35)
MCV RBC AUTO: 91.2 FL (ref 79.6–97.8)
MONOCYTES # BLD: 0.9 K/UL (ref 0.1–1.3)
MONOCYTES NFR BLD: 17 % (ref 4–12)
NEUTS SEG # BLD: 3 K/UL (ref 1.7–8.2)
NEUTS SEG NFR BLD: 59 % (ref 43–78)
NRBC # BLD: 0 K/UL (ref 0–0.2)
PLATELET # BLD AUTO: 115 K/UL (ref 150–450)
PMV BLD AUTO: 11.9 FL (ref 9.4–12.3)
POTASSIUM SERPL-SCNC: 3.7 MMOL/L (ref 3.5–5.1)
RBC # BLD AUTO: 3.52 M/UL (ref 4.05–5.2)
SODIUM SERPL-SCNC: 142 MMOL/L (ref 136–145)
WBC # BLD AUTO: 5.1 K/UL (ref 4.3–11.1)

## 2019-12-11 PROCEDURE — 80048 BASIC METABOLIC PNL TOTAL CA: CPT

## 2019-12-11 PROCEDURE — 74011000250 HC RX REV CODE- 250: Performed by: INTERNAL MEDICINE

## 2019-12-11 PROCEDURE — 74011250636 HC RX REV CODE- 250/636: Performed by: INTERNAL MEDICINE

## 2019-12-11 PROCEDURE — 77030020263 HC SOL INJ SOD CL0.9% LFCR 1000ML

## 2019-12-11 PROCEDURE — 36415 COLL VENOUS BLD VENIPUNCTURE: CPT

## 2019-12-11 PROCEDURE — 74011250637 HC RX REV CODE- 250/637: Performed by: FAMILY MEDICINE

## 2019-12-11 PROCEDURE — 99218 HC RM OBSERVATION: CPT

## 2019-12-11 PROCEDURE — 96376 TX/PRO/DX INJ SAME DRUG ADON: CPT

## 2019-12-11 PROCEDURE — 85025 COMPLETE CBC W/AUTO DIFF WBC: CPT

## 2019-12-11 PROCEDURE — 74011250636 HC RX REV CODE- 250/636: Performed by: FAMILY MEDICINE

## 2019-12-11 RX ADMIN — SODIUM CHLORIDE 125 ML/HR: 900 INJECTION, SOLUTION INTRAVENOUS at 11:42

## 2019-12-11 RX ADMIN — PROMETHAZINE HYDROCHLORIDE 12.5 MG: 25 INJECTION INTRAMUSCULAR; INTRAVENOUS at 03:37

## 2019-12-11 RX ADMIN — MYCOPHENOLATE MOFETIL 1000 MG: 500 TABLET, FILM COATED ORAL at 08:35

## 2019-12-11 RX ADMIN — AMLODIPINE BESYLATE 10 MG: 10 TABLET ORAL at 08:35

## 2019-12-11 RX ADMIN — Medication 10 ML: at 05:49

## 2019-12-11 RX ADMIN — TACROLIMUS 1 MG: 1 CAPSULE ORAL at 08:35

## 2019-12-11 RX ADMIN — SODIUM CHLORIDE 125 ML/HR: 900 INJECTION, SOLUTION INTRAVENOUS at 03:37

## 2019-12-11 NOTE — PROGRESS NOTES
Patient discharge via wheelchair to private auto. Resps even/unlabored. No acute distress noted. No further needs noted.

## 2019-12-11 NOTE — PROGRESS NOTES
[facility] Dr. Radha Phipps. Informed Patient is complaining of nausea. Patient states Zofran gives her a headache and prefers Phenergan.

## 2019-12-11 NOTE — DISCHARGE SUMMARY
Hospitalist Discharge Summary     Patient ID:  Rocio Carrillo  [id number]  55 y.o.  1980  Admit date: 12/9/2019  6:49 PM  Discharge date and time: 12/11/2019  Attending: Tiera Doan DO  PCP:  Sb Garay MD  Treatment Team: Attending Provider: Jose Nascimento; Primary Nurse: Aye Quinn; Care Manager: Rhett Crisostomo RN; Consulting Provider: Prashant Bhatia MD    Principal Diagnosis Fever   Principal Problem:    Fever (11/7/2018)    Active Problems:    History of liver transplant ([facility] [address] (5/28/2017)      History of kidney transplant (10/31/2017)      HTN (hypertension) (11/7/2018)      UTI (urinary tract infection) (12/10/2019)             Hospital Course:  Please refer to the admission H&P for details of presentation. In summary, the patient is Patient 40yoF with PMH significant for renal and liver transplant secondary to autoimmune hepatitis, follows with [facility], who presents with fever, nausea, vomiting.  Reports that it started yesterday.  Reports myalgias.  States that she has had a cough, but it is only associated with after she vomits.  Denies abdominal pain, constipation, diarrhea.  Patient has measured fever in the ER. Hospitalist to admit for IV antibiotics given ER concern about immunocompromised status.    "[employer] Emperor shows trace pos LE, cx showing mixed heraclio only. Blood cx ngtd. ID consulted d/t fevers of 102 on 12/9 however these down-trended during hospital course. Pt is currently asymptomatic. No further nausea,vomiting. ID has seen patient and recommends to observe off antibiotics. Pt has had a death in the family and very anxious for discharge. She promises to take her temperature (off tylenol) several times daily over next two days and return to ER if fever recurs.      Significant Diagnostic Studies:   Final [99] 12/09/2019 20:42 12/09/2019 20:46   Study Result     EXAM: Chest x-ray.     INDICATION: Cough.     COMPARISON: November 6, 2018.     TECHNIQUE: Frontal and lateral view chest x-ray.     FINDINGS: The lungs are clear. The cardiac size, mediastinal contour and  pulmonary vasculature are normal. No pneumothorax or pleural effusion is seen.     IMPRESSION  IMPRESSION: Normal chest x-ray. Labs: Results:       Chemistry Recent Labs     12/11/19  0651 12/10/19  0707 12/09/19  1838   * 134* 102*    138 136   K 3.7 3.5 3.9   * 108* 103   CO2 23 22 22   BUN 7 11 13   CREA 0.87 1.02* 1.11*   CA 7.7* 7.8* 8.7   AGAP 7 8 11   AP  --   --  67   TP  --   --  7.8   ALB  --   --  3.7   GLOB  --   --  4.1*   AGRAT  --   --  0.9*      CBC w/Diff Recent Labs     12/11/19  0651 12/10/19  0707 12/09/19  1838   WBC 5.1 5.3 9.1   RBC 3.52* 3.44* 3.95*   HGB 10.7* 10.5* 11.9   HCT 32.1* 31.3* 35.6*   * 98* 156   GRANS 59 69 81*   LYMPH 23 17 8*   EOS 1 0* 2      Cardiac Enzymes No results for input(s): CPK, CKND1, DAYO in the last 72 hours. No lab exists for component: CKRMB, TROIP   Coagulation No results for input(s): PTP, INR, APTT, INREXT, INREXT in the last 72 hours. Lipid Panel No results found for: CHOL, CHOLPOCT, CHOLX, CHLST, CHOLV, 315561, HDL, HDLP, LDL, LDLC, DLDLP, 115356, VLDLC, VLDL, TGLX, TRIGL, TRIGP, TGLPOCT, CHHD, CHHDX   BNP No results for input(s): BNPP in the last 72 hours. Liver Enzymes Recent Labs     12/09/19 1838   TP 7.8   ALB 3.7   AP 67   SGOT 14*      Thyroid Studies No results found for: T4, T3U, TSH, TSHEXT, TSHEXT         Discharge Exam:  Visit Vitals  /76   Pulse (!) 103   Temp 98.3 °F (36.8 °C)   Resp 20   Ht 5' 4\" (1.626 m)   Wt 73.3 kg (161 lb 8 oz)   SpO2 96%   BMI 27.72 kg/m²     General appearance: alert, cooperative, no distress, appears stated age  Lungs: clear to auscultation bilaterally  Heart: regular rate and rhythm, S1, S2 normal, no murmur, click, rub or gallop  Abdomen: soft, non-tender.  Bowel sounds normal. No masses,  no organomegaly  Extremities: no cyanosis or edema  Neurologic: Grossly normal    Disposition: home  Discharge Condition: stable  Patient Instructions:   Current Discharge Medication List      CONTINUE these medications which have NOT CHANGED    Details   amLODIPine (NORVASC) 10 mg tablet Take 10 mg by mouth daily. predniSONE (DELTASONE) 10 mg tablet Take  by mouth daily (with breakfast). CELLCEPT 500 mg Tab take 1,000 mg by mouth two (2) times a day. PROGRAF 1 mg Cap Take 1 mg by mouth two (2) times a day.          STOP taking these medications       ciprofloxacin HCl (CIPRO) 500 mg tablet Comments:   Reason for Stopping:               Activity: as tolerated  Diet: regular      Follow-up  ·   PCP in 1 week  Time spent to discharge patient 35 minutes  Signed:  Sarah Akhtar DO  12/11/2019  4:04 PM

## 2019-12-11 NOTE — INTERDISCIPLINARY ROUNDS
Interdisciplinary team rounds were held 12/11/2019 with the following team members:Care Management, Nutrition, Physical Therapy, Physician and Clinical Coordinator and the patient. Plan of care discussed. See clinical pathway and/or care plan for interventions and desired outcomes.

## 2019-12-11 NOTE — PROGRESS NOTES
Received bedside shift report from off going nurse, Aiyana Plata. Patient resting in bed, visitor at the bedside. Respirations even and unlabored on room air. Bed low and locked. Bedside table, personal belongings and call light all within reach.

## 2019-12-11 NOTE — PROGRESS NOTES
Patient resting in bed quietly. Respirations even and unlabored on room air. Bed low and locked. Bedside table, personal belongings and call light all within reach. Preparing to give bedside shift report to oncoming nurse.

## 2019-12-11 NOTE — DISCHARGE INSTRUCTIONS
Patient Education   Patient Education     DISCHARGE SUMMARY from Nurse    PATIENT INSTRUCTIONS:    After general anesthesia or intravenous sedation, for 24 hours or while taking prescription Narcotics:  · Limit your activities  · Do not drive and operate hazardous machinery  · Do not make important personal or business decisions  · Do  not drink alcoholic beverages  · If you have not urinated within 8 hours after discharge, please contact your surgeon on call. Report the following to your surgeon:  · Excessive pain, swelling, redness or odor of or around the surgical area  · Temperature over 100.5  · Nausea and vomiting lasting longer than 4 hours or if unable to take medications  · Any signs of decreased circulation or nerve impairment to extremity: change in color, persistent  numbness, tingling, coldness or increase pain  · Any questions    What to do at Home:    *  Please give a list of your current medications to your Primary Care Provider. *  Please update this list whenever your medications are discontinued, doses are      changed, or new medications (including over-the-counter products) are added. *  Please carry medication information at all times in case of emergency situations. These are general instructions for a healthy lifestyle:    No smoking/ No tobacco products/ Avoid exposure to second hand smoke  Surgeon General's Warning:  Quitting smoking now greatly reduces serious risk to your health. Obesity, smoking, and sedentary lifestyle greatly increases your risk for illness    A healthy diet, regular physical exercise & weight monitoring are important for maintaining a healthy lifestyle    You may be retaining fluid if you have a history of heart failure or if you experience any of the following symptoms:  Weight gain of 3 pounds or more overnight or 5 pounds in a week, increased swelling in our hands or feet or shortness of breath while lying flat in bed.   Please call your doctor as soon as you notice any of these symptoms; do not wait until your next office visit. The discharge information has been reviewed with the patient. The patient verbalized understanding. Discharge medications reviewed with the patient and appropriate educational materials and side effects teaching were provided. ___________________________________________________________________________________________________________________________________     Viral Infections: Care Instructions  Your Care Instructions    You don't feel well, but it's not clear what's causing it. You may have a viral infection. Viruses cause many illnesses, such as the common cold, influenza, fever, rashes, and the diarrhea, nausea, and vomiting that are often called \"stomach flu. \" You may wonder if antibiotic medicines could make you feel better. But antibiotics only treat infections caused by bacteria. They don't work on viruses. The good news is that viral infections usually aren't serious. Most will go away in a few days without medical treatment. In the meantime, there are a few things you can do to make yourself more comfortable. Follow-up care is a key part of your treatment and safety. Be sure to make and go to all appointments, and call your doctor if you are having problems. It's also a good idea to know your test results and keep a list of the medicines you take. How can you care for yourself at home? · Get plenty of rest if you feel tired. · Take an over-the-counter pain medicine if needed, such as acetaminophen (Tylenol), ibuprofen (Advil, Motrin), or naproxen (Aleve). Read and follow all instructions on the label. · Be careful when taking over-the-counter cold or flu medicines and Tylenol at the same time. Many of these medicines have acetaminophen, which is Tylenol. Read the labels to make sure that you are not taking more than the recommended dose. Too much acetaminophen (Tylenol) can be harmful.   · Drink plenty of fluids, enough so that your urine is light yellow or clear like water. If you have kidney, heart, or liver disease and have to limit fluids, talk with your doctor before you increase the amount of fluids you drink. · Stay home from work, school, and other public places while you have a fever. When should you call for help? Call 911 anytime you think you may need emergency care. For example, call if:    · You have severe trouble breathing.     · You passed out (lost consciousness).    Call your doctor now or seek immediate medical care if:    · You seem to be getting much sicker.     · You have a new or higher fever.     · You have blood in your stools.     · You have new belly pain, or your pain gets worse.     · You have a new rash.    Watch closely for changes in your health, and be sure to contact your doctor if:    · You start to get better and then get worse.     · You do not get better as expected. Where can you learn more? Go to http://maddie-sarah.info/. Enter U271 in the search box to learn more about \"Viral Infections: Care Instructions. \"  Current as of: June 9, 2019  Content Version: 12.2  © 2872-7705 [employer]. Care instructions adapted under license by [employer] (which disclaims liability or warranty for this information). If you have questions about a medical condition or this instruction, always ask your healthcare professional. [address] any warranty or liability for your use of this information. Learning About Fever  What is a fever? A fever is a high body temperature. It's one way your body fights being sick. A fever shows that the body is responding to infection or other illnesses, both minor and severe. A fever is a symptom, not an illness by itself. A fever can be a sign that you are ill, but most fevers are not caused by a serious problem. You may have a fever with a minor illness, such as a cold.  But sometimes a very serious infection may cause little or no fever. It is important to look at other symptoms, other conditions you have, and how you feel in general. In children, notice how they act and see what symptoms they complain of. What is a normal body temperature? A normal body temperature is about 98. 6ºF. Some people have a normal temperature that is a little higher or a little lower than this. Your temperature may be a little lower in the morning than it is later in the day. It may go up during hot weather or when you exercise, wear heavy clothes, or take a hot bath. Your temperature may also be different depending on how you take it. A temperature taken in the mouth (oral) or under the arm may be a little lower than your core temperature (rectal). What is a fever temperature? A core temperature of 100.4°F or above is considered a fever. What can cause a fever? A fever may be caused by:  · Infections. This is the most common cause of a fever. Examples of infections that can cause a fever include the flu, a kidney infection, or pneumonia. · Some medicines. · Severe trauma or injury, such as a heart attack, stroke, heatstroke, or burns. · Other medical conditions, such as arthritis and some cancers. How can you treat a fever at home? · Ask your doctor if you can take an over-the-counter pain medicine, such as acetaminophen (Tylenol), ibuprofen (Advil, Motrin), or naproxen (Aleve). Be safe with medicines. Read and follow all instructions on the label. · To prevent dehydration, drink plenty of fluids. Choose water and other caffeine-free clear liquids until you feel better. If you have kidney, heart, or liver disease and have to limit fluids, talk with your doctor before you increase the amount of fluids you drink. Follow-up care is a key part of your treatment and safety. Be sure to make and go to all appointments, and call your doctor if you are having problems.  It's also a good idea to know your test results and keep a list of the medicines you take. Where can you learn more? Go to http://maddie-sarah.info/. Enter U387 in the search box to learn more about \"Learning About Fever. \"  Current as of: June 26, 2019  Content Version: 12.2  © 5848-2943 [employer]. Care instructions adapted under license by [employer] (which disclaims liability or warranty for this information). If you have questions about a medical condition or this instruction, always ask your healthcare professional. [address] any warranty or liability for your use of this information.

## 2019-12-11 NOTE — CONSULTS
Infectious Disease Consult    Today's Date: 12/11/2019   Admit Date: 12/9/2019    Impression:   · Fever in immunosuppressed patient, temp curve is improved today. Cultures NTD. Associated symptom of nausea/vomiting that has resolved ?viral gastroenteritis   · Immunosuppressed, taking Prograf/Cellcept/Prednisone for hx liver/renal transplant 2017 after failed liver transplant 2002 for autoimmune hepatitis      Plan:   · Discontinue Ceftriaxone and observe off   · If fever remains controlled and pt continues to look improved, may DC tomorrow    Anti-infectives:   · CTX 1g Q24 12/9-12/11    Subjective:   Date of Consultation:  December 11, 2019  Referring Physician: Dr Layne Scruggs    Patient is a 35 y.o. female who presented to the hospital with 24hrs of persistent fever as high as 102 and associated symptoms of chills, nausea and vomiting. Denies having cough, sinus drainage, dysuria, diarrhea, abdominal pain, chest discomfort, rash/sores/wounds. Denies recent med changes, procedures, no obvious sick contacts but has a son who is in school and work at [employer], does not recall eating anything questionable. She has a hx significant for autoimmune hepatitis undergoing liver transplant 2002--failed, undergoing liver/renal transplant in 2017-followed at [address] and currently on Cellcept, Prograf, and prednisone. BC NTD, UA benign, Ucx with NSF, PCT 0.14, Cr 1.11, no leukocytosis. She has been on Ceftriaxone since admission, and as of today, fever/chills and nausea have resolved. ID consulted to make treatment recommendations.      Patient Active Problem List   Diagnosis Code    Autoimmune hepatitis ([facility] [address] K75.4    History of liver transplant ([facility] [address] Z94.4    History of kidney transplant Z94.0    Hyperglycemia, drug-induced R73.9, T50.905A    Sepsis due to undetermined organism, with acute renal failure ([address] A41.9, R65.20, N17.9    HTN (hypertension) I10    Fever R50.9    UTI (urinary tract infection) N39.0     Past Medical History:   Diagnosis Date    Diabetes ([facility] [address]     Infectious disease     hepatitis c    Liver disease     transplant , liver and kidney transplant 2017      Family History   Problem Relation Age of Onset    Cancer Mother     Hypertension Father     Stroke Father     Hypertension Sister       Social History     Tobacco Use    Smoking status: Never Smoker    Smokeless tobacco: Never Used   Substance Use Topics    Alcohol use: No     Past Surgical History:   Procedure Laterality Date    HX  SECTION      HX HERNIA REPAIR      HX LIVER TRANSPLANT      10/7/02 and 17    HX TRANSPLANT      liver      Prior to Admission medications    Medication Sig Start Date End Date Taking? Authorizing Provider   amLODIPine (NORVASC) 10 mg tablet Take 10 mg by mouth daily. Yes Provider, Historical   predniSONE (DELTASONE) 10 mg tablet Take  by mouth daily (with breakfast). Yes Provider, Historical   CELLCEPT 500 mg Tab take 1,000 mg by mouth two (2) times a day. Yes Other, MD Coco   PROGRAF 1 mg Cap Take 1 mg by mouth two (2) times a day. Yes Other, MD Coco   ciprofloxacin HCl (CIPRO) 500 mg tablet Take 1 Tab by mouth every twelve (12) hours. 11/10/18   Magaly Panda MD       No Known Allergies     Review of Systems:  A comprehensive review of systems was negative except for that written in the History of Present Illness. Objective:     Visit Vitals  /84   Pulse (!) 103   Temp 98.3 °F (36.8 °C)   Resp 18   Ht 5' 4\" (1.626 m)   Wt 73.3 kg (161 lb 8 oz)   SpO2 99%   BMI 27.72 kg/m²     Temp (24hrs), Av.3 °F (37.9 °C), Min:98.3 °F (36.8 °C), Max:102.9 °F (39.4 °C)       Lines:  Peripheral IV:   R arm intact    Physical Exam:    General:  Alert, cooperative, well noursished, well developed, appears stated age   Eyes:  Sclera anicteric. Pupils equally round and reactive to light.    Mouth/Throat: Mucous membranes normal, oral pharynx clear   Neck: Supple, no LAD   Lungs: Clear to auscultation bilaterally, good effort   CV:  Regular rate and rhythm,no murmur, click, rub or gallop   Abdomen:   Soft, non-tender.  bowel sounds normal. non-distended   Extremities: No cyanosis or edema   Skin: Skin color, texture, turgor normal. no acute rash or lesions   Lymph nodes: Cervical and supraclavicular normal   Musculoskeletal: No swelling or deformity   Lines/Devices:  Intact, no erythema, drainage or tenderness   Psych: Alert and oriented, normal mood affect given the setting       Data Review:     CBC:  Recent Labs     12/11/19  0651 12/10/19  0707 12/09/19  1838   WBC 5.1 5.3 9.1   GRANS 59 69 81*   MONOS 17* 13* 9   EOS 1 0* 2   ANEU 3.0 3.7 7.4   ABL 1.2 0.9 0.7   HGB 10.7* 10.5* 11.9   HCT 32.1* 31.3* 35.6*   * 98* 156       BMP:  Recent Labs     12/11/19 0651 12/10/19  0707 12/09/19 1838   CREA 0.87 1.02* 1.11*   BUN 7 11 13    138 136   K 3.7 3.5 3.9   * 108* 103   CO2 23 22 22   AGAP 7 8 11   * 134* 102*       LFTS:  Recent Labs     12/09/19 1838   TBILI 0.7   ALT 19   SGOT 14*   AP 67   TP 7.8   ALB 3.7       Microbiology:     All Micro Results     Procedure Component Value Units Date/Time    CULTURE, URINE [[id number]] Collected:  12/09/19 2100    Order Status:  Completed Specimen:  Urine from Clean catch Updated:  12/11/19 0738     Special Requests: NO SPECIAL REQUESTS        Culture result:       10,000 to 50,000 COLONIES/mL MIXED SKIN HARVEY ISOLATED          CULTURE, BLOOD [[id number]] Collected:  12/09/19 1838    Order Status:  Completed Specimen:  Blood Updated:  12/11/19 0644     Special Requests: --        RIGHT  HAND       Culture result: NO GROWTH 2 DAYS       CULTURE, BLOOD [[id number]] Collected:  12/09/19 2023    Order Status:  Completed Specimen:  Blood Updated:  12/11/19 0644     Special Requests: --        NO SPECIAL REQUESTS  LEFT  HAND       Culture result: NO GROWTH 2 DAYS       INFLUENZA A & B AG (RAPID TEST) [[id number]] Collected: 19    Order Status:  Completed Specimen:  Nasopharyngeal from Nasal washing Updated:  19     Influenza A Ag NEGATIVE         Comment: NEGATIVE FOR THE PRESENCE OF INFLUENZA A ANTIGEN  INFECTION DUE TO INFLUENZA A CANNOT BE RULED OUT. BECAUSE THE ANTIGEN PRESENT IN THE SAMPLE MAY BE BELOW  THE DETECTION LIMIT OF THE TEST. A NEGATIVE TEST IS PRESUMPTIVE AND IT IS RECOMMENDED THAT THESE RESULTS BE CONFIRMED BY VIRAL CULTURE OR AN FDA-CLEARED INFLUENZA A AND B MOLECULAR ASSAY. Influenza B Ag NEGATIVE         Comment: NEGATIVE FOR THE PRESENCE OF INFLUENZA B ANTIGEN  INFECTION DUE TO INFLUENZA B CANNOT BE RULED OUT. BECAUSE THE ANTIGEN PRESENT IN THE SAMPLE MAY BE BELOW  THE DETECTION LIMIT OF THE TEST. A NEGATIVE TEST IS PRESUMPTIVE AND IT IS RECOMMENDED THAT THESE RESULTS BE CONFIRMED BY VIRAL CULTURE OR AN FDA-CLEARED INFLUENZA A AND B MOLECULAR ASSAY. Source NASOPHARYNGEAL             Imagin/9/19 CXR  IMPRESSION: Normal chest x-ray.     Signed By: Gilbert Cortes NP     2019

## 2019-12-11 NOTE — PROGRESS NOTES
Hospitalist Progress Note    12/10/2019  Admit Date: 2019  6:49 PM   NAME: Edgar Aviles   :  1980   MRN:  [id number]   Attending: Dori Osorio MD  PCP:  Gia Jordan MD    SUBJECTIVE:   Patient 40yoF with PMH significant for renal and liver transplant secondary to autoimmune hepatitis, follows with [facility], who presents with fever, nausea, vomiting. Reports that it started yesterday. Reports myalgias. States that she has had a cough, but it is only associated with after she vomits. Denies abdominal pain, constipation, diarrhea. Patient has measured fever in the ER. UA shows trace pos LE suspicious for UTI. Hospitalist to admit for IV antibiotics given ER concern about immunocompromised status. Pt with similar presentation in 2018 with workup notable for ecoli bacteremia thought secondary to UTI.     12/10 - pt reports she feels great. Able to eat lunch w/o difficulties. Discouraged by fever of 102 with RN taking temp in room. Review of Systems negative with exception of pertinent positives noted above  PHYSICAL EXAM     Visit Vitals  /79 (BP 1 Location: Right arm, BP Patient Position: At rest)   Pulse (!) 108   Temp (!) 102.9 °F (39.4 °C)   Resp 20   Ht 5' 4\" (1.626 m)   Wt 74.8 kg (165 lb)   SpO2 97%   BMI 28.32 kg/m²      Temp (24hrs), Av.6 °F (38.1 °C), Min:98.7 °F (37.1 °C), Max:102.9 °F (39.4 °C)    Oxygen Therapy  O2 Sat (%): 97 % (12/10/19 2000)  Pulse via Oximetry: 96 beats per minute (19 2301)  O2 Device: Room air (12/10/19 1351)    Intake/Output Summary (Last 24 hours) at 12/10/2019 2029  Last data filed at 12/10/2019 1837  Gross per 24 hour   Intake 1990.42 ml   Output 300 ml   Net 1690.42 ml      General: No acute distress    Lungs:  CTA Bilaterally.    Heart:  Regular rate and rhythm,  No murmur, rub, or gallop  Abdomen: Soft, Non distended, Non tender, Positive bowel sounds  Extremities: No cyanosis, clubbing or edema  Neurologic:  No focal deficits    ASSESSMENT      Active Hospital Problems    Diagnosis Date Noted    UTI (urinary tract infection) 12/10/2019    Fever 11/07/2018    HTN (hypertension) 11/07/2018    History of kidney transplant 10/31/2017    History of liver transplant ([facility] [address] 05/28/2017     A/P  - fevers - presumed source is mild UTI (UA with trace LE, trace bacteria, 5-10 WBC). Continue rocephin. Follow blood/urine cxs. If still fevering in AM will need ID consult and will talk with transplant service at [address].     - HTN - BP controlled    - Kidney/liver transplant - continue prednisone/cellcept. Monitor renal and liver functions    - Thrombocytopenia - appears baseline from prior admission.  monitor    DVT Prophylaxis: scds    Signed By: Richmond Menendez DO     December 10, 2019

## 2019-12-12 LAB
BACTERIA SPEC CULT: NORMAL
SERVICE CMNT-IMP: NORMAL
TACROLIMUS BLD-MCNC: 8 NG/ML (ref 2–20)

## 2019-12-14 LAB
BACTERIA SPEC CULT: NORMAL
BACTERIA SPEC CULT: NORMAL
SERVICE CMNT-IMP: NORMAL
SERVICE CMNT-IMP: NORMAL

## 2022-03-18 PROBLEM — R65.20 SEPSIS DUE TO UNDETERMINED ORGANISM, WITH ACUTE RENAL FAILURE (HCC): Status: ACTIVE | Noted: 2018-11-07

## 2022-03-18 PROBLEM — N17.9 SEPSIS DUE TO UNDETERMINED ORGANISM, WITH ACUTE RENAL FAILURE (HCC): Status: ACTIVE | Noted: 2018-11-07

## 2022-03-18 PROBLEM — A41.9 SEPSIS DUE TO UNDETERMINED ORGANISM, WITH ACUTE RENAL FAILURE (HCC): Status: ACTIVE | Noted: 2018-11-07

## 2022-03-19 PROBLEM — I10 HTN (HYPERTENSION): Status: ACTIVE | Noted: 2018-11-07

## 2022-03-19 PROBLEM — Z94.4 HISTORY OF LIVER TRANSPLANT (HCC): Status: ACTIVE | Noted: 2017-05-28

## 2022-03-19 PROBLEM — N39.0 UTI (URINARY TRACT INFECTION): Status: ACTIVE | Noted: 2019-12-10

## 2022-03-20 PROBLEM — R73.9 HYPERGLYCEMIA, DRUG-INDUCED: Status: ACTIVE | Noted: 2017-10-31

## 2022-03-20 PROBLEM — K75.4 AUTOIMMUNE HEPATITIS (HCC): Status: ACTIVE | Noted: 2017-05-28

## 2022-03-20 PROBLEM — R50.9 FEVER: Status: ACTIVE | Noted: 2018-11-07

## 2022-03-20 PROBLEM — T50.905A HYPERGLYCEMIA, DRUG-INDUCED: Status: ACTIVE | Noted: 2017-10-31

## 2022-03-20 PROBLEM — Z94.0 HISTORY OF KIDNEY TRANSPLANT: Status: ACTIVE | Noted: 2017-10-31

## 2022-11-08 NOTE — PROGRESS NOTES
Past Medical History:   Diagnosis Date    Anticoagulant long-term use     Arthritis     Atrial fibrillation     CHF (congestive heart failure)     Diabetes mellitus     Type2 resolved after weight loss surgery    DVT (deep venous thrombosis)     Encounter for blood transfusion     GERD (gastroesophageal reflux disease)     Glaucoma     Hypercholesterolemia     Hyperlipemia     Hypertension     Memory changes     Myocardial infarction     Renal failure     resolved    TIA (transient ischemic attack)        Past Surgical History:   Procedure Laterality Date    BACK SURGERY  06/21/2017    lumbar fusion 6/21/17 and surgery for hematoma to site on 6/26/17    CHOLECYSTECTOMY  1978    COLONOSCOPY N/A 10/3/2022    Procedure: COLONOSCOPY;  Surgeon: Jourdan Parks MD;  Location: [facility] ENDO;  Service: General;  Laterality: N/A;    COLONOSCOPY N/A 10/11/2022    Procedure: COLONOSCOPY;  Surgeon: Stephen Cooper MD;  Location: [facility] ENDO;  Service: Endoscopy;  Laterality: N/A;    ESOPHAGOGASTRODUODENOSCOPY N/A 10/3/2022    Procedure: EGD (ESOPHAGOGASTRODUODENOSCOPY);  Surgeon: Jourdan Parks MD;  Location: [facility] ENDO;  Service: General;  Laterality: N/A;    GASTRIC BYPASS      HYSTERECTOMY  2012    JOINT REPLACEMENT      TKR    LEFT HEART CATHETERIZATION Left 2/5/2021    Procedure: Left heart cath;  Surgeon: Shane Pacheco MD;  Location: [facility] CATH;  Service: Cardiovascular;  Laterality: Left;    PELVIC LAPAROSCOPY Left     OOPH    RENAL BIOPSY N/A 10/5/2022    Procedure: BIOPSY, KIDNEY;  Surgeon: Velia Diagnostic Provider;  Location: [facility] OR;  Service: General;  Laterality: N/A;    RIGHT HEART CATHETERIZATION Right 2/5/2021    Procedure: INSERTION, CATHETER, RIGHT HEART. via left radial and left brachial;  Surgeon: Shane Pacheco MD;  Location: [facility] CATH;  Service: Cardiovascular;  Laterality: Right;    TOTAL ABDOMINAL HYSTERECTOMY W/ BILATERAL SALPINGOOPHORECTOMY         Review of patient's allergies indicates:  No Known  SBAR received from [facility]. Patient remains in stable condition with respirations even/unlabored. No acute distress noted at this time. Call light remains within reach, patient encouraged to call nurse prn assist. Will continue to monitor per policy. Allergies    Family History       Problem Relation (Age of Onset)    Arthritis Mother    Breast cancer Mother    Cancer Father    Diabetes Daughter    Heart disease Mother, Father    Hypertension Mother, Father          Tobacco Use    Smoking status: Never    Smokeless tobacco: Never   Substance and Sexual Activity    Alcohol use: Yes     Comment: occasional    Drug use: No    Sexual activity: Not Currently      Review of Systems   Constitutional:  Positive for appetite change and unexpected weight change. Negative for chills, fatigue and fever.   HENT:  Negative for congestion and dental problem.    Eyes:  Negative for photophobia and visual disturbance.   Respiratory:  Negative for cough and shortness of breath.    Cardiovascular:  Negative for chest pain and leg swelling.   Gastrointestinal:  Positive for abdominal pain, diarrhea, nausea and vomiting.   Genitourinary:  Negative for difficulty urinating and dysuria.   Musculoskeletal:  Negative for arthralgias and back pain.   Skin:  Negative for color change and rash.   Neurological:  Negative for light-headedness and headaches.   Hematological:  Negative for adenopathy. Does not bruise/bleed easily.   Psychiatric/Behavioral:  Negative for agitation and behavioral problems.    Objective:     Vital Signs (Most Recent):  Temp: 97.2 °F (36.2 °C) (11/07/22 1627)  Pulse: 85 (11/07/22 1732)  Resp: 18 (11/07/22 1732)  BP: (!) 94/52 (11/07/22 1732)  SpO2: 96 % (11/07/22 1732)   Vital Signs (24h Range):  Temp:  [97.2 °F (36.2 °C)-98.5 °F (36.9 °C)] 97.2 °F (36.2 °C)  Pulse:  [] 85  Resp:  [16-18] 18  SpO2:  [96 %-100 %] 96 %  BP: ()/(48-65) 94/52   Weight: 88.5 kg (195 lb)  Body mass index is 32.45 kg/m².      Intake/Output Summary (Last 24 hours) at 11/7/2022 1812  Last data filed at 11/7/2022 1743  Gross per 24 hour   Intake 1290 ml   Output 750 ml   Net 540 ml       Physical Exam  Constitutional:       General: She is not in acute distress.     Appearance:  She is well-developed.   HENT:      Head: Normocephalic and atraumatic.      Nose: Nose normal. No congestion.      Mouth/Throat:      Mouth: Mucous membranes are dry.      Pharynx: No oropharyngeal exudate.   Eyes:      General: No scleral icterus.     Extraocular Movements: Extraocular movements intact.   Cardiovascular:      Rate and Rhythm: Normal rate and regular rhythm.      Heart sounds: No murmur heard.  Pulmonary:      Effort: Pulmonary effort is normal. No respiratory distress.      Breath sounds: Normal breath sounds.   Abdominal:      General: There is no distension.      Palpations: Abdomen is soft.      Tenderness: There is abdominal tenderness (mid, lower abdomen).   Musculoskeletal:         General: No swelling. Normal range of motion.      Cervical back: Normal range of motion and neck supple.   Skin:     General: Skin is warm and dry.   Neurological:      Mental Status: She is alert and oriented to person, place, and time. Mental status is at baseline.   Psychiatric:         Mood and Affect: Mood normal.         Behavior: Behavior normal.       Vents:     Lines/Drains/Airways       Drain  Duration             Female External Urinary Catheter 11/06/22 0300 1 day              Peripheral Intravenous Line  Duration                  Midline Catheter Insertion/Assessment  - Single Lumen 11/07/22 1540 Right cephalic vein (lateral side of arm) 18g x 8cm <1 day                  Significant Labs:    CBC/Anemia Profile:  Recent Labs   Lab 11/06/22  1251 11/07/22  0652   WBC 7.16 4.95   HGB 9.2* 8.0*   HCT 27.5* 24.0*    217   MCV 88 88   RDW 20.4* 19.0*        Chemistries:  Recent Labs   Lab 11/06/22  1435 11/06/22  2045 11/07/22  0652 11/07/22  1550    143 147* 148*   K 2.4* 2.4* 2.5* 3.7   * 119* 119* 122*   CO2 18* 16* 17* 14*   BUN 13 13 12 12   CREATININE 1.6* 1.5* 1.6* 1.7*   CALCIUM 7.0* 7.0* 7.4* 7.7*   ALBUMIN 1.5* 1.5* 1.5* 1.5*   PROT 4.6*  --  4.7*  --    BILITOT 0.9  --  0.7   --    ALKPHOS 89  --  91  --    ALT 10  --  10  --    AST 40  --  39  --    MG 1.5*  --  1.9  --    PHOS  --  2.6* 2.6* 2.6*       All pertinent labs within the past 24 hours have been reviewed.    Significant Imaging: I have reviewed all pertinent imaging results/findings within the past 24 hours.

## 2023-01-02 ENCOUNTER — HOSPITAL ENCOUNTER (EMERGENCY)
Age: 43
Discharge: HOME | End: 2023-01-02
Attending: EMERGENCY MEDICINE | Admitting: EMERGENCY MEDICINE
Payer: COMMERCIAL

## 2023-01-02 ENCOUNTER — HOSPITAL ENCOUNTER (EMERGENCY)
Dept: GENERAL RADIOLOGY | Age: 43
Discharge: HOME | End: 2023-01-05
Payer: COMMERCIAL

## 2023-01-02 ENCOUNTER — APPOINTMENT (OUTPATIENT)
Dept: CT IMAGING | Age: 43
End: 2023-01-02
Payer: COMMERCIAL

## 2023-01-02 VITALS
OXYGEN SATURATION: 100 % | HEART RATE: 88 BPM | RESPIRATION RATE: 20 BRPM | SYSTOLIC BLOOD PRESSURE: 138 MMHG | DIASTOLIC BLOOD PRESSURE: 99 MMHG | TEMPERATURE: 98.9 F

## 2023-01-02 DIAGNOSIS — R11.2 NAUSEA AND VOMITING, UNSPECIFIED VOMITING TYPE: Primary | ICD-10-CM

## 2023-01-02 LAB
ALBUMIN SERPL-MCNC: 3.8 G/DL (ref 3.5–5)
ALBUMIN/GLOB SERPL: 0.7 {RATIO} (ref 0.4–1.6)
ALP SERPL-CCNC: 72 U/L (ref 50–136)
ALT SERPL-CCNC: 33 U/L (ref 12–65)
ANION GAP SERPL CALC-SCNC: 6 MMOL/L (ref 2–11)
AST SERPL-CCNC: 30 U/L (ref 15–37)
BASOPHILS # BLD: 0 K/UL (ref 0–0.2)
BASOPHILS NFR BLD: 0 % (ref 0–2)
BILIRUB SERPL-MCNC: 1.3 MG/DL (ref 0.2–1.1)
BILIRUB UR QL: NEGATIVE
BUN SERPL-MCNC: 16 MG/DL (ref 6–23)
CALCIUM SERPL-MCNC: 9.6 MG/DL (ref 8.3–10.4)
CHLORIDE SERPL-SCNC: 101 MMOL/L (ref 101–110)
CO2 SERPL-SCNC: 23 MMOL/L (ref 21–32)
CREAT SERPL-MCNC: 1.6 MG/DL (ref 0.6–1)
DIFFERENTIAL METHOD BLD: ABNORMAL
EOSINOPHIL # BLD: 0 K/UL (ref 0–0.8)
EOSINOPHIL NFR BLD: 0 % (ref 0.5–7.8)
ERYTHROCYTE [DISTWIDTH] IN BLOOD BY AUTOMATED COUNT: 12.6 % (ref 11.9–14.6)
GLOBULIN SER CALC-MCNC: 5.5 G/DL (ref 2.8–4.5)
GLUCOSE SERPL-MCNC: 95 MG/DL (ref 65–100)
GLUCOSE UR QL STRIP.AUTO: NEGATIVE MG/DL
HCT VFR BLD AUTO: 39.5 % (ref 35.8–46.3)
HGB BLD-MCNC: 13.6 G/DL (ref 11.7–15.4)
IMM GRANULOCYTES # BLD AUTO: 0.1 K/UL (ref 0–0.5)
IMM GRANULOCYTES NFR BLD AUTO: 1 % (ref 0–5)
KETONES UR-MCNC: >160 MG/DL
LEUKOCYTE ESTERASE UR QL STRIP: NEGATIVE
LIPASE SERPL-CCNC: 62 U/L (ref 73–393)
LYMPHOCYTES # BLD: 1.3 K/UL (ref 0.5–4.6)
LYMPHOCYTES NFR BLD: 12 % (ref 13–44)
MCH RBC QN AUTO: 30.4 PG (ref 26.1–32.9)
MCHC RBC AUTO-ENTMCNC: 34.4 G/DL (ref 31.4–35)
MCV RBC AUTO: 88.4 FL (ref 82–102)
MONOCYTES # BLD: 0.9 K/UL (ref 0.1–1.3)
MONOCYTES NFR BLD: 9 % (ref 4–12)
NEUTS SEG # BLD: 8.5 K/UL (ref 1.7–8.2)
NEUTS SEG NFR BLD: 78 % (ref 43–78)
NITRITE UR QL: NEGATIVE
NRBC # BLD: 0 K/UL (ref 0–0.2)
PH UR: 8.5 [PH] (ref 5–9)
PLATELET # BLD AUTO: 222 K/UL (ref 150–450)
PMV BLD AUTO: 11.5 FL (ref 9.4–12.3)
POTASSIUM SERPL-SCNC: 4.4 MMOL/L (ref 3.5–5.1)
PROT SERPL-MCNC: 9.3 G/DL (ref 6.3–8.2)
PROT UR QL: 30 MG/DL
RBC # BLD AUTO: 4.47 M/UL (ref 4.05–5.2)
RBC # UR STRIP: NEGATIVE /UL
SERVICE CMNT-IMP: ABNORMAL
SODIUM SERPL-SCNC: 130 MMOL/L (ref 133–143)
SP GR UR: 1.01 (ref 1–1.02)
UROBILINOGEN UR QL: 0.2 EU/DL (ref 0.2–1)
WBC # BLD AUTO: 10.7 K/UL (ref 4.3–11.1)

## 2023-01-02 PROCEDURE — 96361 HYDRATE IV INFUSION ADD-ON: CPT

## 2023-01-02 PROCEDURE — 96360 HYDRATION IV INFUSION INIT: CPT

## 2023-01-02 PROCEDURE — 74176 CT ABD & PELVIS W/O CONTRAST: CPT

## 2023-01-02 PROCEDURE — 81003 URINALYSIS AUTO W/O SCOPE: CPT

## 2023-01-02 PROCEDURE — 6370000000 HC RX 637 (ALT 250 FOR IP)

## 2023-01-02 PROCEDURE — 72100 X-RAY EXAM L-S SPINE 2/3 VWS: CPT

## 2023-01-02 PROCEDURE — 85025 COMPLETE CBC W/AUTO DIFF WBC: CPT

## 2023-01-02 PROCEDURE — 83690 ASSAY OF LIPASE: CPT

## 2023-01-02 PROCEDURE — 99284 EMERGENCY DEPT VISIT MOD MDM: CPT

## 2023-01-02 PROCEDURE — 73630 X-RAY EXAM OF FOOT: CPT

## 2023-01-02 PROCEDURE — 80053 COMPREHEN METABOLIC PANEL: CPT

## 2023-01-02 PROCEDURE — 2580000003 HC RX 258

## 2023-01-02 RX ORDER — 0.9 % SODIUM CHLORIDE 0.9 %
1000 INTRAVENOUS SOLUTION INTRAVENOUS
Status: COMPLETED | OUTPATIENT
Start: 2023-01-02 | End: 2023-01-02

## 2023-01-02 RX ORDER — TRAMADOL HYDROCHLORIDE 50 MG/1
50 TABLET ORAL
Status: COMPLETED | OUTPATIENT
Start: 2023-01-02 | End: 2023-01-02

## 2023-01-02 RX ORDER — 0.9 % SODIUM CHLORIDE 0.9 %
1000 INTRAVENOUS SOLUTION INTRAVENOUS
Status: DISCONTINUED | OUTPATIENT
Start: 2023-01-02 | End: 2023-01-02 | Stop reason: HOSPADM

## 2023-01-02 RX ORDER — HYDROCODONE BITARTRATE AND ACETAMINOPHEN 5; 325 MG/1; MG/1
1 TABLET ORAL
Status: DISCONTINUED | OUTPATIENT
Start: 2023-01-02 | End: 2023-01-02

## 2023-01-02 RX ORDER — ONDANSETRON 4 MG/1
4 TABLET, ORALLY DISINTEGRATING ORAL
Status: DISCONTINUED | OUTPATIENT
Start: 2023-01-02 | End: 2023-01-02 | Stop reason: HOSPADM

## 2023-01-02 RX ADMIN — TRAMADOL HYDROCHLORIDE 50 MG: 50 TABLET ORAL at 17:30

## 2023-01-02 RX ADMIN — SODIUM CHLORIDE 1000 ML: 9 INJECTION, SOLUTION INTRAVENOUS at 16:26

## 2023-01-02 ASSESSMENT — ENCOUNTER SYMPTOMS
NAUSEA: 1
VOMITING: 1
COUGH: 0
SINUS PRESSURE: 0
SHORTNESS OF BREATH: 0
ABDOMINAL PAIN: 0
SINUS PAIN: 0
WHEEZING: 0
BACK PAIN: 1

## 2023-01-02 NOTE — Clinical Note
Zaria Pretty was seen and treated in our emergency department on 1/2/2023. She may return to work on 01/04/2023. If you have any questions or concerns, please don't hesitate to call.       Christiane Moritz, [place]

## 2023-01-02 NOTE — ED TRIAGE NOTES
Pt arrives for complaint of N/V with lower back pain that started last night. Pt with extensive transplant history. Received a call a few days ago telling her that her liver enzymes were elevated.

## 2023-01-02 NOTE — ED PROVIDER NOTES
Emergency Department Provider Note                   PCP:                Baron Caity MD               Age: 36 y.o. Sex: female       ICD-10-CM    1. Nausea and vomiting, unspecified vomiting type  R11.2           DISPOSITION Decision To Discharge 01/02/2023 09:07:26 PM       Medical Decision Making  This patient is a 24-year-old female with a past medical history of autoimmune hepatitis, liver transplant, and kidney transplant who presents today due to 1 day of nausea, vomiting, lumbar pain, right foot pain. Differential diagnosis considered include but is not limited to transplant rejection, urinary tract infection, right foot fracture. Initial CBC shows CBC is unremarkable. CMP shows an elevation in the patient's creatinine to 1.6 and her GFR is 41. Her bilirubin is elevated at 1.3. Her LFTs are otherwise within normal limits. I reached out to the patient's transplant team at [address]  to discuss these results in light of her extensive kidney and liver transplant history. I spoke with Dr. Mark Erickson at the [address] transplant center who advised me to treat this patient's nausea and vomiting with IV fluids to obtain a UA to evaluate for possible kidney infection and imaging as needed for the patient's area of pain. CT scan of the patient's abdomen reveals no acute process, but it did demonstrate several ventral abdominal hernias that are un obstructive. The patient is previously aware of this finding. On exam, these hernias are nontender and spontaneously reproduce. On re-evaluation of the patient after fluids and pain medication, she states that her nausea and pain have subsided. Her right foot x-ray was negative for acute fracture or dislocation. It is not edematous, erythematous, or cellulitic. She has normal 2+ dorsalis pedis pulses bilaterally. I discussed this case with two of my attending physicians, Dr. No Locke and Dr. Car Vasquez.   At this time, the patient was discharged with strict return precautions and I encouraged her to follow up with her PCP. We also discussed the importance of keeping her follow up appointment with her transplant team.      Problems Addressed:  Nausea and vomiting, unspecified vomiting type: acute illness or injury    Amount and/or Complexity of Data Reviewed  Labs: ordered. Radiology: ordered. Discussion of management or test interpretation with external provider(s): Dr. Gisell Bridges, [facility] [place]    Risk  Prescription drug management. ED Course as of 01/05/23 0909   Mon Jan 02, 2023   1524 I reached out to the patient's transplant team who told me that they would page the providers and have them call me back here. [KS]   5679 I spoke with Tano Norris from [facility] who told me that the patient's previous LFTs and kidney function levels on Dec 9th 2022 are as follows:   AST 70 and ALT 93 Bili 0.8   GFR 55 Creatinine 1.25    She will have GI fellow Dr. Juanita Solis call me with recommendations for this patient. [KS]   1553 XR Right foot IMPRESSION:     No acute osseous abnormality. [KS]   1794 I spoke with Dr. Gisell Bridges, the attending physician from [facility]. She advised that the patient's change in her creatinine and GFR aren't significantly concerning from a transplant standpoint. She suggested that I work-up the patient's nausea and vomiting and go from there. She did advise to obtain a urinalysis in order to evaluate for possible infection of the kidneys. She suggested possible imaging of the kidneys if the patient was having abdominal pain. She also advised checking a tacrolimus level. [KS]   1603 XR Lumbar Spine IMPRESSION:     No acute osseous abnormality of the lumbar spine. [KS]   1722 Last tacrolimus yesterday evening per patient. [KS]   2022 CT IMPRESSION:     1. No suggestion of acute intra-abdominal abnormality.   2.  Postsurgical changes compatible with liver transplantation and kidney transplantation. 3.  Ventral abdominal wall hernias, 2 of which contain a loop of bowel, without evidence for bowel obstruction. 4.  Splenomegaly. [KS]   2048 Upon reevaluation, the patient states that she is feeling much better. [KS]   2054 Pt reports history of hernia that she needed removed. There were ventral hernias seen on CT today, 2 of which contain bowel. On re-evaluation of the patient's abdomen, a large ventral hernia is palpated. It reduces spontaneously and is non painful.   [KS]      ED Course User Index  [KS] JINNY Serna        Orders Placed This Encounter   Procedures    XR FOOT RIGHT (MIN 3 VIEWS)    XR LUMBAR SPINE (2-3 VIEWS)    CT ABDOMEN PELVIS WO CONTRAST Additional Contrast? None    CBC with Auto Differential    Comprehensive Metabolic Panel    Lipase    POCT Urine Dipstick    POCT Urinalysis no Micro        Medications   0.9 % sodium chloride bolus (0 mLs IntraVENous Stopped 1/2/23 2121)   traMADol (ULTRAM) tablet 50 mg (50 mg Oral Given 1/2/23 1730)       Discharge Medication List as of 1/2/2023  9:17 PM           Radha Pretty is a 36 y.o. female who presents to the Emergency Department with chief complaint of  No chief complaint on file. Nausea and vomiting starting last night. Unable to tolerate anything PO. Feeling slight SOB. Pain started last night in both lumbar pain and right foot pain. Hx of liver transplant in 2002 and 2017. Hx of right kidney transplant 2017. Taking Cellcept 1000mg, Embarsas. Was unable to take these medications today due to nausea and vomiting. The patient also reports lightheadedness and feeling of dehydration. The history is provided by the patient. Review of Systems   Constitutional:  Negative for activity change, chills and fever. HENT:  Negative for sinus pressure and sinus pain. Respiratory:  Negative for cough, shortness of breath and wheezing. Cardiovascular:  Negative for chest pain and palpitations. Gastrointestinal:  Positive for nausea and vomiting. Negative for abdominal pain. Genitourinary:  Negative for dysuria, frequency and urgency. Musculoskeletal:  Positive for back pain and gait problem (walking with cane). Negative for myalgias, neck pain and neck stiffness. Neurological:  Positive for dizziness and light-headedness. Negative for headaches. Past Medical History:   Diagnosis Date    Diabetes ([address]     Infectious disease     hepatitis c    Liver disease     transplant , liver and kidney transplant 2017        Past Surgical History:   Procedure Laterality Date     SECTION      HERNIA REPAIR      LIVER TRANSPLANT      10/7/02 and 17    TRANSPLANT      liver        Family History   Problem Relation Age of Onset    Cancer Mother     Hypertension Father     Hypertension Sister     Stroke Father         Social History     Socioeconomic History    Marital status: Single   Tobacco Use    Smoking status: Never    Smokeless tobacco: Never   Substance and Sexual Activity    Alcohol use: No    Drug use: No        Allergies: Patient has no known allergies. Discharge Medication List as of 2023  9:17 PM        CONTINUE these medications which have NOT CHANGED    Details   amLODIPine (NORVASC) 10 MG tablet Take 10 mg by mouth dailyHistorical Med      mycophenolate (CELLCEPT) 500 MG tablet Take 1,000 mg by mouth 2 times dailyHistorical Med      predniSONE (DELTASONE) 10 MG tablet Take by mouth daily (with breakfast)Historical Med      tacrolimus (PROGRAF) 1 MG capsule Take 1 mg by mouth 2 times dailyHistorical Med              Vitals signs and nursing note reviewed. No data found. Physical Exam  Vitals and nursing note reviewed. Constitutional:       General: She is not in acute distress. Appearance: Normal appearance. She is not toxic-appearing. HENT:      Head: Normocephalic and atraumatic. Eyes:      General:         Right eye: No discharge.          Left eye: No discharge. Extraocular Movements: Extraocular movements intact. Conjunctiva/sclera: Conjunctivae normal.      Pupils: Pupils are equal, round, and reactive to light. Cardiovascular:      Rate and Rhythm: Normal rate and regular rhythm. Pulses: Normal pulses. Heart sounds: Normal heart sounds. Pulmonary:      Effort: Pulmonary effort is normal.      Breath sounds: Normal breath sounds. Abdominal:      General: Abdomen is flat. There is no distension. Palpations: Abdomen is soft. There is no mass. Tenderness: There is no abdominal tenderness. There is no right CVA tenderness, left CVA tenderness or guarding. Musculoskeletal:         General: Tenderness (lumbar back; no midline spinal tenderness; Right foot tenderness) present. No swelling or deformity. Normal range of motion. Cervical back: Normal range of motion. Right lower leg: No edema. Left lower leg: No edema. Comments: The patient's right foot pain is on the lateral aspect. There is no erythema, edema, cellulitis, or lesions to the right foot. 2+ dorsalis pedis pulses bilaterally. Skin:     Capillary Refill: Capillary refill takes less than 2 seconds. Neurological:      General: No focal deficit present. Mental Status: She is alert and oriented to person, place, and time. Mental status is at baseline. Psychiatric:         Mood and Affect: Mood normal.         Behavior: Behavior normal.         Thought Content: Thought content normal.         Judgment: Judgment normal.        Procedures        Results for orders placed or performed during the hospital encounter of 01/02/23   XR FOOT RIGHT (MIN 3 VIEWS)    Narrative    STUDY: XR FOOT RIGHT (MIN 3 VIEWS) [id number]     STUDY DATE: 1/2/2023 3:03 PM     HISTORY: pain 1-5th MTP joint     COMPARISON: None available    TECHNIQUE: 3 views right foot    FINDINGS:    ALIGNMENT: No dislocation. BONES:No acute fracture.  Bipartite lateral hallux sesamoid. JOINTS:  Maintained. SOFT TISSUES: Within normal limits. Impression    No acute osseous abnormality. XR LUMBAR SPINE (2-3 VIEWS)    Narrative    Study: XR LUMBAR SPINE (2-3 VIEWS)    Views: 3    Indication: lower lumbar pain. Comparisons: May 27, 2017    FINDINGS:    Segmentation: 5 nonrib-bearing lumbar-type bodies. Hardware: None. Alignment: No significant static listhesis. Curvature: None. Bones: Vertebral body stature maintained. No findings of acute fracture. Disc/joint spaces: The intervertebral disc heights are maintained. Mild lower  lumbar facet arthropathy. Soft tissues: Surgical clips project over the upper abdomen and right lower  quadrant. Impression    No acute osseous abnormality of the lumbar spine. CT ABDOMEN PELVIS WO CONTRAST Additional Contrast? None    Narrative    STUDY: CT ABDOMEN PELVIS WO CONTRAST [id number]     STUDY DATE: 1/2/2023 5:06 PM     HISTORY: lumbar back pain; hx of kidney transplant and liver transplant     COMPARISON:  May 27, 2017    TECHNIQUE: Multiple axial images were obtained through the abdomen and pelvis  without IV contrast.  Radiation dose reduction techniques were used for this  study: All CT scans performed at this facility use one or all of the following:  Automated exposure control, adjustment of the mA and/or kVp according to  patient's size, iterative reconstruction. FINDINGS:    LOWER THORAX: Lung bases are clear. No pericardial or pleural effusion. LIVER: Redemonstrated sequela of prior liver transplant. The unenhanced liver is  otherwise unremarkable. BILIARY: Gallbladder is surgically absent. No biliary ductal dilatation. PANCREAS: Unremarkable. SPLEEN: Splenomegaly. ADRENALS: No adrenal nodule or adrenal hypertrophy. URINARY: Diffuse atrophy of the native kidneys without suspicious renal lesion,  hydronephrosis or nephrolithiasis.  Transplanted kidney noted within the right  lower quadrant without hydronephrosis, nephrolithiasis or suspicious renal  lesion. The urinary bladder is unremarkable. BOWEL: The stomach is unremarkable. No evidence for bowel obstruction. The colon  is predominantly decompressed. No focal bowel wall thickening. No evidence for  acute appendicitis. VASCULAR: The abdominal aorta and iliac arterial system are normal in caliber. NODES: No lymphadenopathy. FLUID:  No intraperitoneal free fluid. REPRODUCTIVE: The right adnexal cysts, one measuring 1.8 cm and the other  measuring 2.4 cm. The uterus and left adnexa are unremarkable. BONES/SOFT TISSUE: No acute or suspicious osseous abnormality. No acute  subcutaneous abnormality. Fat-containing umbilical hernia. Supraumbilical hernia  contains a loop of bowel. Additionally, there is a hernia within the left  ventral abdominal wall, which also contains a loop of bowel. Impression    1. No suggestion of acute intra-abdominal abnormality. 2.  Postsurgical changes compatible with liver transplantation and kidney  transplantation. 3.  Ventral abdominal wall hernias, 2 of which contain a loop of bowel, without  evidence for bowel obstruction. 4.  Splenomegaly.          CBC with Auto Differential   Result Value Ref Range    WBC 10.7 4.3 - 11.1 K/uL    RBC 4.47 4.05 - 5.2 M/uL    Hemoglobin 13.6 11.7 - 15.4 g/dL    Hematocrit 39.5 35.8 - 46.3 %    MCV 88.4 82 - 102 FL    MCH 30.4 26.1 - 32.9 PG    MCHC 34.4 31.4 - 35.0 g/dL    RDW 12.6 11.9 - 14.6 %    Platelets 581 692 - 050 K/uL    MPV 11.5 9.4 - 12.3 FL    nRBC 0.00 0.0 - 0.2 K/uL    Differential Type AUTOMATED      Seg Neutrophils 78 43 - 78 %    Lymphocytes 12 (L) 13 - 44 %    Monocytes 9 4.0 - 12.0 %    Eosinophils % 0 (L) 0.5 - 7.8 %    Basophils 0 0.0 - 2.0 %    Immature Granulocytes 1 0.0 - 5.0 %    Segs Absolute 8.5 (H) 1.7 - 8.2 K/UL    Absolute Lymph # 1.3 0.5 - 4.6 K/UL    Absolute Mono # 0.9 0.1 - 1.3 K/UL    Absolute Eos # 0.0 0.0 - 0.8 K/UL    Basophils Absolute 0.0 0.0 - 0.2 K/UL    Absolute Immature Granulocyte 0.1 0.0 - 0.5 K/UL   Comprehensive Metabolic Panel   Result Value Ref Range    Sodium 130 (L) 133 - 143 mmol/L    Potassium 4.4 3.5 - 5.1 mmol/L    Chloride 101 101 - 110 mmol/L    CO2 23 21 - 32 mmol/L    Anion Gap 6 2 - 11 mmol/L    Glucose 95 65 - 100 mg/dL    BUN 16 6 - 23 MG/DL    Creatinine 1.60 (H) 0.6 - 1.0 MG/DL    Est, Glom Filt Rate 41 (L) >60 ml/min/1.73m2    Calcium 9.6 8.3 - 10.4 MG/DL    Total Bilirubin 1.3 (H) 0.2 - 1.1 MG/DL    ALT 33 12 - 65 U/L    AST 30 15 - 37 U/L    Alk Phosphatase 72 50 - 136 U/L    Total Protein 9.3 (H) 6.3 - 8.2 g/dL    Albumin 3.8 3.5 - 5.0 g/dL    Globulin 5.5 (H) 2.8 - 4.5 g/dL    Albumin/Globulin Ratio 0.7 0.4 - 1.6     Lipase   Result Value Ref Range    Lipase 62 (L) 73 - 393 U/L   POCT Urinalysis no Micro   Result Value Ref Range    Specific Gravity, Urine, POC 1.015 1.001 - 1.023      pH, Urine, POC 8.5 5.0 - 9.0      Protein, Urine, POC 30 (A) NEG mg/dL    Glucose, UA POC Negative NEG mg/dL    Ketones, Urine, POC >160 (A) NEG mg/dL    Bilirubin, Urine, POC Negative NEG      Blood, UA POC Negative NEG      URINE UROBILINOGEN POC 0.2 0.2 - 1.0 EU/dL    Nitrate, Urine, POC Negative NEG      Leukocyte Est, UA POC Negative NEG      Performed by: Pam Patino         CT ABDOMEN PELVIS WO CONTRAST Additional Contrast? None   Final Result      1. No suggestion of acute intra-abdominal abnormality. 2.  Postsurgical changes compatible with liver transplantation and kidney   transplantation. 3.  Ventral abdominal wall hernias, 2 of which contain a loop of bowel, without   evidence for bowel obstruction. 4.  Splenomegaly. XR FOOT RIGHT (MIN 3 VIEWS)   Final Result      No acute osseous abnormality. XR LUMBAR SPINE (2-3 VIEWS)   Final Result      No acute osseous abnormality of the lumbar spine.                             Voice dictation software was used during the making of this note. This software is not perfect and grammatical and other typographical errors may be present. This note has not been completely proofread for errors.        Christiane Moritz, [place]  01/05/23 1179

## 2023-01-03 ENCOUNTER — HOSPITAL ENCOUNTER (INPATIENT)
Age: 43
LOS: 2 days | Discharge: HOME | End: 2023-01-06
Attending: EMERGENCY MEDICINE | Admitting: FAMILY MEDICINE
Payer: COMMERCIAL

## 2023-01-03 DIAGNOSIS — Z94.4 S/P LIVER TRANSPLANT (HCC): ICD-10-CM

## 2023-01-03 DIAGNOSIS — R11.2 INTRACTABLE VOMITING WITH NAUSEA: ICD-10-CM

## 2023-01-03 DIAGNOSIS — Z94.0 S/P KIDNEY TRANSPLANT: ICD-10-CM

## 2023-01-03 DIAGNOSIS — R50.9 FEVER, UNSPECIFIED FEVER CAUSE: Primary | ICD-10-CM

## 2023-01-03 LAB
ALBUMIN SERPL-MCNC: 2.8 G/DL (ref 3.5–5)
ALBUMIN/GLOB SERPL: 0.6 (ref 0.4–1.6)
ALP SERPL-CCNC: 54 U/L (ref 50–136)
ALT SERPL-CCNC: 24 U/L (ref 12–65)
ANION GAP SERPL CALC-SCNC: 12 MMOL/L (ref 2–11)
APPEARANCE UR: ABNORMAL
AST SERPL-CCNC: 20 U/L (ref 15–37)
BACTERIA URNS QL MICRO: ABNORMAL /HPF
BASOPHILS # BLD: 0 K/UL (ref 0–0.2)
BASOPHILS NFR BLD: 0 % (ref 0–2)
BILIRUB SERPL-MCNC: 1 MG/DL (ref 0.2–1.1)
BILIRUB UR QL: NEGATIVE
BUN SERPL-MCNC: 26 MG/DL (ref 6–23)
CALCIUM SERPL-MCNC: 7.9 MG/DL (ref 8.3–10.4)
CASTS URNS QL MICRO: ABNORMAL /LPF
CHLORIDE SERPL-SCNC: 97 MMOL/L (ref 101–110)
CO2 SERPL-SCNC: 22 MMOL/L (ref 21–32)
COLOR UR: ABNORMAL
CREAT SERPL-MCNC: 1.5 MG/DL (ref 0.6–1)
DIFFERENTIAL METHOD BLD: ABNORMAL
EOSINOPHIL # BLD: 0 K/UL (ref 0–0.8)
EOSINOPHIL NFR BLD: 0 % (ref 0.5–7.8)
EPI CELLS #/AREA URNS HPF: ABNORMAL /HPF
ERYTHROCYTE [DISTWIDTH] IN BLOOD BY AUTOMATED COUNT: 12.9 % (ref 11.9–14.6)
FLUAV AG NPH QL IA: NEGATIVE
FLUBV AG NPH QL IA: NEGATIVE
GLOBULIN SER CALC-MCNC: 4.6 G/DL (ref 2.8–4.5)
GLUCOSE SERPL-MCNC: 122 MG/DL (ref 65–100)
GLUCOSE UR STRIP.AUTO-MCNC: NEGATIVE MG/DL
HCT VFR BLD AUTO: 31.7 % (ref 35.8–46.3)
HGB BLD-MCNC: 10.7 G/DL (ref 11.7–15.4)
HGB UR QL STRIP: ABNORMAL
IMM GRANULOCYTES # BLD AUTO: 0.1 K/UL (ref 0–0.5)
IMM GRANULOCYTES NFR BLD AUTO: 1 % (ref 0–5)
KETONES UR QL STRIP.AUTO: 40 MG/DL
LEUKOCYTE ESTERASE UR QL STRIP.AUTO: ABNORMAL
LIPASE SERPL-CCNC: 106 U/L (ref 73–393)
LYMPHOCYTES # BLD: 0.8 K/UL (ref 0.5–4.6)
LYMPHOCYTES NFR BLD: 7 % (ref 13–44)
MAGNESIUM SERPL-MCNC: 0.9 MG/DL (ref 1.8–2.4)
MCH RBC QN AUTO: 30.1 PG (ref 26.1–32.9)
MCHC RBC AUTO-ENTMCNC: 33.8 G/DL (ref 31.4–35)
MCV RBC AUTO: 89.3 FL (ref 82–102)
MONOCYTES # BLD: 1.3 K/UL (ref 0.1–1.3)
MONOCYTES NFR BLD: 11 % (ref 4–12)
NEUTS SEG # BLD: 9.1 K/UL (ref 1.7–8.2)
NEUTS SEG NFR BLD: 81 % (ref 43–78)
NITRITE UR QL STRIP.AUTO: NEGATIVE
NRBC # BLD: 0 K/UL (ref 0–0.2)
PH UR STRIP: 6 (ref 5–9)
PLATELET # BLD AUTO: 134 K/UL (ref 150–450)
PMV BLD AUTO: 11.2 FL (ref 9.4–12.3)
POTASSIUM SERPL-SCNC: 3.1 MMOL/L (ref 3.5–5.1)
PROT SERPL-MCNC: 7.4 G/DL (ref 6.3–8.2)
PROT UR STRIP-MCNC: 100 MG/DL
RBC # BLD AUTO: 3.55 M/UL (ref 4.05–5.2)
RBC #/AREA URNS HPF: ABNORMAL /HPF
SARS-COV-2 RDRP RESP QL NAA+PROBE: NOT DETECTED
SODIUM SERPL-SCNC: 131 MMOL/L (ref 133–143)
SOURCE: NORMAL
SP GR UR REFRACTOMETRY: 1.02 (ref 1–1.02)
SPECIMEN SOURCE: NORMAL
UROBILINOGEN UR QL STRIP.AUTO: 1 EU/DL (ref 0.2–1)
WBC # BLD AUTO: 11.3 K/UL (ref 4.3–11.1)
WBC URNS QL MICRO: ABNORMAL /HPF

## 2023-01-03 PROCEDURE — 83735 ASSAY OF MAGNESIUM: CPT

## 2023-01-03 PROCEDURE — 87040 BLOOD CULTURE FOR BACTERIA: CPT

## 2023-01-03 PROCEDURE — 80053 COMPREHEN METABOLIC PANEL: CPT

## 2023-01-03 PROCEDURE — 85025 COMPLETE CBC W/AUTO DIFF WBC: CPT

## 2023-01-03 PROCEDURE — 2580000003 HC RX 258: Performed by: EMERGENCY MEDICINE

## 2023-01-03 PROCEDURE — 87635 SARS-COV-2 COVID-19 AMP PRB: CPT

## 2023-01-03 PROCEDURE — 99285 EMERGENCY DEPT VISIT HI MDM: CPT

## 2023-01-03 PROCEDURE — 6370000000 HC RX 637 (ALT 250 FOR IP): Performed by: EMERGENCY MEDICINE

## 2023-01-03 PROCEDURE — 96374 THER/PROPH/DIAG INJ IV PUSH: CPT

## 2023-01-03 PROCEDURE — 83690 ASSAY OF LIPASE: CPT

## 2023-01-03 PROCEDURE — 94760 N-INVAS EAR/PLS OXIMETRY 1: CPT

## 2023-01-03 PROCEDURE — 81001 URINALYSIS AUTO W/SCOPE: CPT

## 2023-01-03 PROCEDURE — 87804 INFLUENZA ASSAY W/OPTIC: CPT

## 2023-01-03 PROCEDURE — 6360000002 HC RX W HCPCS: Performed by: EMERGENCY MEDICINE

## 2023-01-03 RX ORDER — 0.9 % SODIUM CHLORIDE 0.9 %
1000 INTRAVENOUS SOLUTION INTRAVENOUS ONCE
Status: COMPLETED | OUTPATIENT
Start: 2023-01-03 | End: 2023-01-04

## 2023-01-03 RX ORDER — IBUPROFEN 800 MG/1
800 TABLET ORAL
Status: COMPLETED | OUTPATIENT
Start: 2023-01-03 | End: 2023-01-03

## 2023-01-03 RX ORDER — PROMETHAZINE HYDROCHLORIDE 25 MG/1
12.5 TABLET ORAL
Status: DISCONTINUED | OUTPATIENT
Start: 2023-01-03 | End: 2023-01-03

## 2023-01-03 RX ORDER — ONDANSETRON 2 MG/ML
4 INJECTION INTRAMUSCULAR; INTRAVENOUS
Status: DISCONTINUED | OUTPATIENT
Start: 2023-01-03 | End: 2023-01-03

## 2023-01-03 RX ORDER — PROMETHAZINE HYDROCHLORIDE 25 MG/ML
12.5 INJECTION, SOLUTION INTRAMUSCULAR; INTRAVENOUS EVERY 6 HOURS PRN
Status: DISCONTINUED | OUTPATIENT
Start: 2023-01-03 | End: 2023-01-06 | Stop reason: HOSPADM

## 2023-01-03 RX ORDER — PROCHLORPERAZINE EDISYLATE 5 MG/ML
10 INJECTION INTRAMUSCULAR; INTRAVENOUS
Status: COMPLETED | OUTPATIENT
Start: 2023-01-03 | End: 2023-01-03

## 2023-01-03 RX ORDER — ONDANSETRON 2 MG/ML
4 INJECTION INTRAMUSCULAR; INTRAVENOUS ONCE
Status: DISCONTINUED | OUTPATIENT
Start: 2023-01-03 | End: 2023-01-06 | Stop reason: HOSPADM

## 2023-01-03 RX ADMIN — PROCHLORPERAZINE EDISYLATE 10 MG: 5 INJECTION INTRAMUSCULAR; INTRAVENOUS at 23:33

## 2023-01-03 RX ADMIN — SODIUM CHLORIDE 1000 ML: 9 INJECTION, SOLUTION INTRAVENOUS at 23:33

## 2023-01-03 RX ADMIN — IBUPROFEN 800 MG: 800 TABLET, FILM COATED ORAL at 23:33

## 2023-01-03 ASSESSMENT — ENCOUNTER SYMPTOMS
NAUSEA: 1
ABDOMINAL PAIN: 0
RHINORRHEA: 0
BACK PAIN: 0
VOMITING: 1
EYE REDNESS: 0
EYE DISCHARGE: 0
SHORTNESS OF BREATH: 0
COLOR CHANGE: 0
DIARRHEA: 1
COUGH: 0
FACIAL SWELLING: 0

## 2023-01-03 ASSESSMENT — PAIN SCALES - GENERAL
PAINLEVEL_OUTOF10: 8
PAINLEVEL_OUTOF10: 5

## 2023-01-03 ASSESSMENT — PAIN - FUNCTIONAL ASSESSMENT: PAIN_FUNCTIONAL_ASSESSMENT: 0-10

## 2023-01-03 NOTE — ED NOTES
I have reviewed discharge instructions with the patient. The patient verbalized understanding. Patient left ED via Discharge Method: ambulatory to Home with self    Opportunity for questions and clarification provided. Patient given 0 scripts. To continue your aftercare when you leave the hospital, you may receive an automated call from our care team to check in on how you are doing. This is a free service and part of our promise to provide the best care and service to meet your aftercare needs.  If you have questions, or wish to unsubscribe from this service please call [phone]. Thank you for Choosing our [address] [place] [address] Emergency Department.         Terri Killian RN  01/02/23 9595

## 2023-01-03 NOTE — DISCHARGE INSTRUCTIONS
Please make an appointment with your primary care provider to discuss your visit here today. Please also get in touch with your transplant team to schedule your next appointment. Increase your fluid intake over the next several days. If you have any new or worsening symptoms, such as worsening back pain, abdominal pain, or uncontrollable nausea and vomiting, please return here for further evaluation. Please call the number listed above to see a podiatrist for your right foot pain.

## 2023-01-03 NOTE — ED TRIAGE NOTES
Pt states she is a liver and kidney transplant pt and has been vomiting. States she was evaluated yesterday at [facility] and discharged home.

## 2023-01-04 PROBLEM — K75.4 AUTOIMMUNE HEPATITIS (HCC): Status: ACTIVE | Noted: 2017-05-28

## 2023-01-04 PROBLEM — Z94.0 HISTORY OF KIDNEY TRANSPLANT: Status: ACTIVE | Noted: 2017-10-31

## 2023-01-04 PROBLEM — Z94.4 HISTORY OF LIVER TRANSPLANT (HCC): Status: ACTIVE | Noted: 2017-05-28

## 2023-01-04 PROBLEM — N39.0 UTI (URINARY TRACT INFECTION): Status: ACTIVE | Noted: 2019-12-10

## 2023-01-04 PROBLEM — R50.9 FEVER AND CHILLS: Status: ACTIVE | Noted: 2023-01-04

## 2023-01-04 PROBLEM — E87.1 HYPONATREMIA: Status: ACTIVE | Noted: 2023-01-04

## 2023-01-04 PROBLEM — R50.9 FEVER: Status: RESOLVED | Noted: 2018-11-07 | Resolved: 2023-01-04

## 2023-01-04 PROBLEM — D72.829 LEUKOCYTOSIS: Status: ACTIVE | Noted: 2023-01-04

## 2023-01-04 PROBLEM — E87.6 HYPOKALEMIA: Status: ACTIVE | Noted: 2023-01-04

## 2023-01-04 PROBLEM — I10 HTN (HYPERTENSION): Status: ACTIVE | Noted: 2018-11-07

## 2023-01-04 PROCEDURE — 6360000002 HC RX W HCPCS: Performed by: FAMILY MEDICINE

## 2023-01-04 PROCEDURE — 6370000000 HC RX 637 (ALT 250 FOR IP): Performed by: FAMILY MEDICINE

## 2023-01-04 PROCEDURE — 6360000002 HC RX W HCPCS: Performed by: EMERGENCY MEDICINE

## 2023-01-04 PROCEDURE — 2580000003 HC RX 258: Performed by: FAMILY MEDICINE

## 2023-01-04 PROCEDURE — 1100000000 HC RM PRIVATE

## 2023-01-04 RX ORDER — MAGNESIUM SULFATE IN WATER 40 MG/ML
2000 INJECTION, SOLUTION INTRAVENOUS PRN
Status: DISCONTINUED | OUTPATIENT
Start: 2023-01-04 | End: 2023-01-06 | Stop reason: HOSPADM

## 2023-01-04 RX ORDER — AMLODIPINE BESYLATE 10 MG/1
10 TABLET ORAL DAILY
Status: DISCONTINUED | OUTPATIENT
Start: 2023-01-04 | End: 2023-01-06 | Stop reason: HOSPADM

## 2023-01-04 RX ORDER — ONDANSETRON 2 MG/ML
4 INJECTION INTRAMUSCULAR; INTRAVENOUS EVERY 6 HOURS PRN
Status: DISCONTINUED | OUTPATIENT
Start: 2023-01-04 | End: 2023-01-06 | Stop reason: HOSPADM

## 2023-01-04 RX ORDER — POTASSIUM CHLORIDE 20 MEQ/1
40 TABLET, EXTENDED RELEASE ORAL PRN
Status: DISCONTINUED | OUTPATIENT
Start: 2023-01-04 | End: 2023-01-06 | Stop reason: HOSPADM

## 2023-01-04 RX ORDER — SODIUM CHLORIDE 9 MG/ML
INJECTION, SOLUTION INTRAVENOUS CONTINUOUS
Status: DISCONTINUED | OUTPATIENT
Start: 2023-01-04 | End: 2023-01-05

## 2023-01-04 RX ORDER — MYCOPHENOLATE MOFETIL 500 MG/1
1000 TABLET ORAL 2 TIMES DAILY
Status: DISCONTINUED | OUTPATIENT
Start: 2023-01-04 | End: 2023-01-06 | Stop reason: HOSPADM

## 2023-01-04 RX ORDER — ENOXAPARIN SODIUM 100 MG/ML
40 INJECTION SUBCUTANEOUS DAILY
Status: DISCONTINUED | OUTPATIENT
Start: 2023-01-04 | End: 2023-01-04

## 2023-01-04 RX ORDER — POLYETHYLENE GLYCOL 3350 17 G/17G
17 POWDER, FOR SOLUTION ORAL DAILY PRN
Status: DISCONTINUED | OUTPATIENT
Start: 2023-01-04 | End: 2023-01-06 | Stop reason: HOSPADM

## 2023-01-04 RX ORDER — ONDANSETRON 4 MG/1
4 TABLET, ORALLY DISINTEGRATING ORAL EVERY 8 HOURS PRN
Status: DISCONTINUED | OUTPATIENT
Start: 2023-01-04 | End: 2023-01-06 | Stop reason: HOSPADM

## 2023-01-04 RX ORDER — SODIUM CHLORIDE 0.9 % (FLUSH) 0.9 %
5-40 SYRINGE (ML) INJECTION PRN
Status: DISCONTINUED | OUTPATIENT
Start: 2023-01-04 | End: 2023-01-06 | Stop reason: HOSPADM

## 2023-01-04 RX ORDER — HEPARIN SODIUM 5000 [USP'U]/ML
5000 INJECTION, SOLUTION INTRAVENOUS; SUBCUTANEOUS 2 TIMES DAILY
Status: DISCONTINUED | OUTPATIENT
Start: 2023-01-04 | End: 2023-01-05

## 2023-01-04 RX ORDER — SODIUM CHLORIDE 0.9 % (FLUSH) 0.9 %
5-40 SYRINGE (ML) INJECTION EVERY 12 HOURS SCHEDULED
Status: DISCONTINUED | OUTPATIENT
Start: 2023-01-04 | End: 2023-01-06 | Stop reason: HOSPADM

## 2023-01-04 RX ORDER — ACETAMINOPHEN 650 MG/1
650 SUPPOSITORY RECTAL EVERY 6 HOURS PRN
Status: DISCONTINUED | OUTPATIENT
Start: 2023-01-04 | End: 2023-01-06 | Stop reason: HOSPADM

## 2023-01-04 RX ORDER — TACROLIMUS 1 MG/1
1 CAPSULE ORAL 2 TIMES DAILY
Status: DISCONTINUED | OUTPATIENT
Start: 2023-01-04 | End: 2023-01-06 | Stop reason: HOSPADM

## 2023-01-04 RX ORDER — POTASSIUM CHLORIDE 7.45 MG/ML
10 INJECTION INTRAVENOUS PRN
Status: DISCONTINUED | OUTPATIENT
Start: 2023-01-04 | End: 2023-01-06 | Stop reason: HOSPADM

## 2023-01-04 RX ORDER — ACETAMINOPHEN 325 MG/1
650 TABLET ORAL EVERY 6 HOURS PRN
Status: DISCONTINUED | OUTPATIENT
Start: 2023-01-04 | End: 2023-01-06 | Stop reason: HOSPADM

## 2023-01-04 RX ADMIN — ACETAMINOPHEN 650 MG: 325 TABLET, FILM COATED ORAL at 08:22

## 2023-01-04 RX ADMIN — CEFTRIAXONE 1000 MG: 1 INJECTION, POWDER, FOR SOLUTION INTRAMUSCULAR; INTRAVENOUS at 08:23

## 2023-01-04 RX ADMIN — TACROLIMUS 1 MG: 1 CAPSULE ORAL at 09:30

## 2023-01-04 RX ADMIN — PROMETHAZINE HYDROCHLORIDE 12.5 MG: 25 INJECTION INTRAMUSCULAR; INTRAVENOUS at 19:57

## 2023-01-04 RX ADMIN — ACETAMINOPHEN 650 MG: 325 TABLET, FILM COATED ORAL at 20:20

## 2023-01-04 RX ADMIN — TACROLIMUS 1 MG: 1 CAPSULE ORAL at 20:21

## 2023-01-04 RX ADMIN — HEPARIN SODIUM 5000 UNITS: 5000 INJECTION INTRAVENOUS; SUBCUTANEOUS at 20:22

## 2023-01-04 RX ADMIN — AMLODIPINE BESYLATE 10 MG: 10 TABLET ORAL at 09:30

## 2023-01-04 RX ADMIN — MYCOPHENOLATE MOFETIL 1000 MG: 500 TABLET, FILM COATED ORAL at 09:30

## 2023-01-04 RX ADMIN — HEPARIN SODIUM 5000 UNITS: 5000 INJECTION INTRAVENOUS; SUBCUTANEOUS at 09:30

## 2023-01-04 RX ADMIN — MYCOPHENOLATE MOFETIL 1000 MG: 500 TABLET, FILM COATED ORAL at 20:20

## 2023-01-04 RX ADMIN — ACETAMINOPHEN 650 MG: 325 TABLET, FILM COATED ORAL at 14:21

## 2023-01-04 NOTE — ED PROVIDER NOTES
[facility] Emergency Department Provider Note                   PCP:                Brayan Walls MD               Age: 36 y.o. Sex: female     No diagnosis found. DISPOSITION         New Prescriptions    No medications on file       Orders Placed This Encounter   Procedures    Rapid influenza A/B antigens    COVID-19, Rapid    Culture, Blood 1    Culture, Blood 1    CBC with Auto Differential    Comprehensive Metabolic Panel    Lipase    Magnesium    Urinalysis    Continuous Pulse Oximetry    POCT Urine Dipstick    Saline lock IV         Fiorella Pretty is a 36 y.o. female who presents to the Emergency Department with chief complaint of    Chief Complaint   Patient presents with    Emesis      Chief complaint : Vomiting    HISTORY OF PRESENT ILLNESS :  Location : Home    Quality : Nonbloody    Quantity : Too numerous to count    Timing : 2 days    Severity : Moderate to severe    Context : Kidney and liver transplant patient on immunosuppressants  Went to the ER last night with the same but no fever got some fluids and a pill and a CAT scan but left without fully being seen received no prescriptions    Alleviating / exacerbating factors : Throws up with any attempted oral intake    Associated Symptoms : No diarrhea because she is \"empty\"        Review of Systems   Constitutional:  Positive for activity change, appetite change, fatigue and fever. Negative for chills. HENT:  Negative for facial swelling and rhinorrhea. Eyes:  Negative for discharge and redness. Respiratory:  Negative for cough and shortness of breath. Cardiovascular:  Negative for chest pain and palpitations. Gastrointestinal:  Positive for diarrhea, nausea and vomiting. Negative for abdominal pain. Endocrine: Negative for polydipsia and polyuria. Genitourinary:  Negative for difficulty urinating and dysuria. Musculoskeletal:  Negative for arthralgias and back pain. Skin:  Negative for color change and pallor. Neurological:  Negative for dizziness and headaches. All other systems reviewed and are negative. All other systems reviewed and are negative. Past Medical History:   Diagnosis Date    Diabetes ([address]     Infectious disease     hepatitis c    Liver disease     transplant , liver and kidney transplant 2017        Past Surgical History:   Procedure Laterality Date     SECTION      HERNIA REPAIR      LIVER TRANSPLANT      10/7/02 and 17    TRANSPLANT      liver        Family History   Problem Relation Age of Onset    Cancer Mother     Hypertension Father     Hypertension Sister     Stroke Father         Social Connections: Not on file        No Known Allergies     Vitals signs and nursing note reviewed. No data found. Physical Exam  Vitals and nursing note reviewed. Constitutional:       General: She is not in acute distress. Appearance: Normal appearance. She is not ill-appearing, toxic-appearing or diaphoretic. Comments: febrile   HENT:      Head: Normocephalic and atraumatic. Right Ear: External ear normal.      Left Ear: External ear normal.      Nose: No rhinorrhea. Eyes:      General: No scleral icterus. Right eye: No discharge. Left eye: No discharge. Conjunctiva/sclera: Conjunctivae normal.   Cardiovascular:      Rate and Rhythm: Normal rate and regular rhythm. Pulmonary:      Effort: Pulmonary effort is normal. No respiratory distress. Breath sounds: Normal breath sounds. Abdominal:      Palpations: Abdomen is soft. There is no fluid wave or pulsatile mass. Tenderness: There is no abdominal tenderness. There is no guarding or rebound. Musculoskeletal:         General: No deformity or signs of injury. Normal range of motion. Cervical back: Normal range of motion and neck supple. No rigidity. Skin:     General: Skin is warm and dry. Coloration: Skin is not jaundiced or pale.    Neurological:      General: No focal deficit present. Mental Status: She is alert and oriented to person, place, and time. Psychiatric:         Mood and Affect: Mood normal.         Behavior: Behavior normal.         Thought Content: Thought content normal.        MDM      Procedures    Labs Reviewed   RAPID INFLUENZA A/B ANTIGENS   COVID-19, RAPID   CULTURE, BLOOD 1   CULTURE, BLOOD 1   CBC WITH AUTO DIFFERENTIAL   COMPREHENSIVE METABOLIC PANEL   LIPASE   MAGNESIUM   URINALYSIS        No orders to display            Nando Coma Scale  Eye Opening: Spontaneous  Best Verbal Response: Oriented  Best Motor Response: Obeys commands  Nando Coma Scale Score: 15                     Voice dictation software was used during the making of this note. This software is not perfect and grammatical and other typographical errors may be present. This note has not been completely proofread for errors.        Luis Armando Washington MD  01/03/23 9553

## 2023-01-04 NOTE — ED NOTES
TRANSFER - OUT REPORT:    Verbal report given to [address], RN on [facility]  being transferred to [address] [place] [address] for routine progression of patient care       Report consisted of patient's Situation, Background, Assessment and   Recommendations(SBAR). Information from the following report(s) ED Encounter Summary was reviewed with the receiving nurse. Lines:   Peripheral IV 01/03/23 Left Antecubital (Active)        Opportunity for questions and clarification was provided.       Patient transported with:  natanael Palacio RN  01/04/23 4487

## 2023-01-04 NOTE — ED NOTES
Pt given meal tray and continues to wait for room assignment.       Trinity Hernandez RN  01/04/23 0590

## 2023-01-04 NOTE — H&P
[facility]    Patient: Augusto Pretty Date: 2023  female, 36 y.o. Admit Date: 1/3/2023  Attending: Filiberto Hester MD     ASSESSMENT AND PLAN:     Principal Problem: Active Problems:    UTI (urinary tract infection)  Plan: IV Rocephin, IVF. urine culture pending. Hyponatremia  Plan: IV NS, follow BMP    Hypokalemia  Plan: Replace IV, follow BMP    Leukocytosis  Plan: Follow CBC    History of liver transplant ([address]  Plan: Stable. Continue home meds. Follow CMP    HTN (hypertension)  Plan: Stable. Continue home meds. History of kidney transplant  Plan: Stable. Continue home meds. Follow CMP    Autoimmune hepatitis ([address]  Plan: Stable. DVT Prophylaxis: Heparin      Code Status: FULL CODE      Disposition: Admit to med/surg for evaluation and treatment as per above. Anticipated discharge: 2-3 days     CHIEF COMPLAINT:  fever    HISTORY OF PRESENT ILLNESS:      Patient Active Problem List   Diagnosis    Sepsis due to undetermined organism, with acute renal failure ([address]    History of liver transplant ([address]    UTI (urinary tract infection)    HTN (hypertension)    History of kidney transplant    Autoimmune hepatitis (HCC)    Hyperglycemia, drug-induced    Fever and chills    Hyponatremia    Hypokalemia    Leukocytosis       Connie Pretty is a 36 y.o. female, with a history of  has a past medical history of Diabetes ([address] Infectious disease, and Liver disease.,  has a past surgical history that includes  section; transplant; Liver transplant; and hernia repair. who presents to the ER with report of fever, nausea, and vomiting starting last night for the past 24 hours or so. Has vomiting multiple times. Denies any hematemesis, melena, melanemesis. Allergy  No Known Allergies    Medication list  Not in a hospital admission.      Past Medical History   Past Medical History:   Diagnosis Date    Diabetes ([address]     Infectious disease     hepatitis c    Liver disease     transplant , liver and kidney transplant 2017       Past Surgical History:   Procedure Laterality Date     SECTION      HERNIA REPAIR      LIVER TRANSPLANT      10/7/02 and 17    TRANSPLANT      liver       Social History   Social History     Socioeconomic History    Marital status: Single   Tobacco Use    Smoking status: Never    Smokeless tobacco: Never   Substance and Sexual Activity    Alcohol use: No    Drug use: No       Family History:   Family History   Problem Relation Age of Onset    Cancer Mother     Hypertension Father     Hypertension Sister     Stroke Father        REVIEW OF SYSTEMS:   A 14 point review of systems was taken and pertinent positive as per HPI. PHYSICAL EXAMINATION:  Vital 24 Hour Range Most Recent Value  Temperature Temp  Min: 103 °F (39.4 °C)  Max: 103 °F (39.4 °C) (!) 103 °F (39.4 °C)    Pulse Pulse  Min: 92  Max: 92 92  Respiratory Resp  Min: 20  Max: 20 20  Blood Pressure BP  Min: 128/81  Max: 128/81 128/81  Pulse Oximetry SpO2  Min: 99 %  Max: 99 % 99 %  O2 No data recorded      Vital Most Recent Value First Value  Weight 170 lb (77.1 kg) Weight: 170 lb (77.1 kg)  Height 5' 4\" (162.6 cm) Height: 5' 4\" (162.6 cm)  BMI 29.2 N/A    Physical Exam:   General:     No acute distress, speaking in full sentences. Eyes:   No palpebral pallor or scleral icterus. ENT:   External auricular and nasal exam within normal limits. Cardiovascular: No cyanosis or edema of extremities. Respiratory:    No respiratory distress or accessory muscle use. Gastrointestinal:   Not actively vomiting, abdomen non-distended   Skin:      Normal color. No rashes, lesions, or jaundice. Neurologic:  CN II-XII grossly intact and symmetrical.      Moving all four extremities well with no focal deficits. Psychiatric:  Appropriate affect.  Alert     Intake/Output last 3 shifts:  No intake or output data in the 24 hours ending 23 0334     Labs:  Lab Results Component Value Date     (L) 01/03/2023    K 3.1 (L) 01/03/2023    CL 97 (L) 01/03/2023    CO2 22 01/03/2023    BUN 26 (H) 01/03/2023    CREATININE 1.50 (H) 01/03/2023    GLUCOSE 122 (H) 01/03/2023    CALCIUM 7.9 (L) 01/03/2023    PROT 7.4 01/03/2023    LABALBU 2.8 (L) 01/03/2023    BILITOT 1.0 01/03/2023    ALKPHOS 54 01/03/2023    AST 20 01/03/2023    ALT 24 01/03/2023    LABGLOM 44 (L) 01/03/2023    GFRAA >60 12/11/2019    AGRATIO 0.9 (L) 12/09/2019    GLOB 4.6 (H) 01/03/2023        Lab Results   Component Value Date/Time    MG 0.9 01/03/2023 10:21 PM     Lab Results   Component Value Date    CALCIUM 7.9 (L) 01/03/2023        Lab Results   Component Value Date    WBC 11.3 (H) 01/03/2023    HGB 10.7 (L) 01/03/2023    HCT 31.7 (L) 01/03/2023    MCV 89.3 01/03/2023     (L) 01/03/2023        No results found for: INR, PROTIME     No results found for: CKTOTAL, CKMB, CKMBINDEX, TROPONINI     Imagining & Other Studies  CT ABDOMEN PELVIS WO CONTRAST Additional Contrast? None  Narrative: STUDY: CT ABDOMEN PELVIS WO CONTRAST [id number]     STUDY DATE: 1/2/2023 5:06 PM     HISTORY: lumbar back pain; hx of kidney transplant and liver transplant     COMPARISON:  May 27, 2017    TECHNIQUE: Multiple axial images were obtained through the abdomen and pelvis  without IV contrast.  Radiation dose reduction techniques were used for this  study: All CT scans performed at this facility use one or all of the following:  Automated exposure control, adjustment of the mA and/or kVp according to  patient's size, iterative reconstruction. FINDINGS:    LOWER THORAX: Lung bases are clear. No pericardial or pleural effusion. LIVER: Redemonstrated sequela of prior liver transplant. The unenhanced liver is  otherwise unremarkable. BILIARY: Gallbladder is surgically absent. No biliary ductal dilatation. PANCREAS: Unremarkable. SPLEEN: Splenomegaly.     ADRENALS: No adrenal nodule or adrenal hypertrophy. URINARY: Diffuse atrophy of the native kidneys without suspicious renal lesion,  hydronephrosis or nephrolithiasis. Transplanted kidney noted within the right  lower quadrant without hydronephrosis, nephrolithiasis or suspicious renal  lesion. The urinary bladder is unremarkable. BOWEL: The stomach is unremarkable. No evidence for bowel obstruction. The colon  is predominantly decompressed. No focal bowel wall thickening. No evidence for  acute appendicitis. VASCULAR: The abdominal aorta and iliac arterial system are normal in caliber. NODES: No lymphadenopathy. FLUID:  No intraperitoneal free fluid. REPRODUCTIVE: The right adnexal cysts, one measuring 1.8 cm and the other  measuring 2.4 cm. The uterus and left adnexa are unremarkable. BONES/SOFT TISSUE: No acute or suspicious osseous abnormality. No acute  subcutaneous abnormality. Fat-containing umbilical hernia. Supraumbilical hernia  contains a loop of bowel. Additionally, there is a hernia within the left  ventral abdominal wall, which also contains a loop of bowel. Impression: 1. No suggestion of acute intra-abdominal abnormality. 2.  Postsurgical changes compatible with liver transplantation and kidney  transplantation. 3.  Ventral abdominal wall hernias, 2 of which contain a loop of bowel, without  evidence for bowel obstruction. 4.  Splenomegaly. XR FOOT RIGHT (MIN 3 VIEWS)  Narrative: STUDY: XR FOOT RIGHT (MIN 3 VIEWS) [id number]     STUDY DATE: 1/2/2023 3:03 PM     HISTORY: pain 1-5th MTP joint     COMPARISON: None available    TECHNIQUE: 3 views right foot    FINDINGS:    ALIGNMENT: No dislocation. BONES:No acute fracture. Bipartite lateral hallux sesamoid. JOINTS:  Maintained. SOFT TISSUES: Within normal limits. Impression: No acute osseous abnormality. XR LUMBAR SPINE (2-3 VIEWS)  Narrative: Study: XR LUMBAR SPINE (2-3 VIEWS)    Views: 3    Indication: lower lumbar pain.     Comparisons: May 27, 2017    FINDINGS:    Segmentation: 5 nonrib-bearing lumbar-type bodies. Hardware: None. Alignment: No significant static listhesis. Curvature: None. Bones: Vertebral body stature maintained. No findings of acute fracture. Disc/joint spaces: The intervertebral disc heights are maintained. Mild lower  lumbar facet arthropathy. Soft tissues: Surgical clips project over the upper abdomen and right lower  quadrant. Impression: No acute osseous abnormality of the lumbar spine. No results found for this or any previous visit (from the past 4464 hour(s)).      Laly Bundy MD  1/4/2023 3:34 AM

## 2023-01-04 NOTE — ED NOTES
Report to KENYON Ortiz. Care transferred at this time. Kori Nye, [facility]  01/04/23 6049

## 2023-01-04 NOTE — CARE COORDINATION
[facility]     Patient: Cristino Pretty Date: 2023  female, 36 y.o. Admit Date: 1/3/2023  Attending: Aryan Jacobs MD      ASSESSMENT AND PLAN:      Principal Problem: Active Problems:    UTI (urinary tract infection)  Plan: IV Rocephin, IVF. urine culture pending. Hyponatremia  Plan: IV NS, follow BMP    Hypokalemia  Plan: Replace IV, follow BMP    Leukocytosis  Plan: Follow CBC    History of liver transplant ([address]  Plan: Stable. Continue home meds. Follow CMP    HTN (hypertension)  Plan: Stable. Continue home meds. History of kidney transplant  Plan: Stable. Continue home meds. Follow CMP    Autoimmune hepatitis ([address]  Plan: Stable. DVT Prophylaxis: Heparin       Code Status: FULL CODE       Disposition: Admit to med/surg for evaluation and treatment as per above. Anticipated discharge: 2-3 days      CHIEF COMPLAINT:  fever     HISTORY OF PRESENT ILLNESS:           Patient Active Problem List   Diagnosis    Sepsis due to undetermined organism, with acute renal failure ([address]    History of liver transplant ([address]    UTI (urinary tract infection)    HTN (hypertension)    History of kidney transplant    Autoimmune hepatitis (HCC)    Hyperglycemia, drug-induced    Fever and chills    Hyponatremia    Hypokalemia    Leukocytosis         Connie Pretty is a 36 y.o. female, with a history of  has a past medical history of Diabetes ([address] Infectious disease, and Liver disease.,  has a past surgical history that includes  section; transplant; Liver transplant; and hernia repair. who presents to the ER with report of fever, nausea, and vomiting starting last night for the past 24 hours or so. Has vomiting multiple times. Denies any hematemesis, melena, melanemesis.           23 1125   Service Assessment   Patient Orientation Alert and Oriented   Cognition Alert   History Provided By Patient   [address] Spouse/Significant Other  (kayden/Mike #[phone])   Patient's Healthcare Decision Maker is: Legal Next of Isaiah Carbajal   PCP Verified by CM Yes  (Dr. Rashmi Ocampo @ [address])   Prior Functional Level Independent in ADLs/IADLs   Current Functional Level Independent in ADLs/IADLs   Can patient return to prior living arrangement Yes   Ability to make needs known: Good   Family able to assist with home care needs: Yes   Would you like for me to discuss the discharge plan with any other family members/significant others, and if so, who? No   Financial Resources   ([employer])   Community Resources None   Social/Functional History   Lives With Significant other   PT ER contact is Mike (fiance) or Miki Osgood (mother) who is also listed on chart. . She works form home, inde, Rx are filled at [address] [place] [address] [address] Denies any needs and per MD pt may be medically ready on 146. CM to follow.

## 2023-01-04 NOTE — ED NOTES
Report from [facility]. Assumed care of pt at this time. Pt resting with eyes closed at this time with respirations even and unlabored. Franc Perez RN  01/04/23 Saulo Perez, [facility]  01/04/23 1817

## 2023-01-05 PROBLEM — Z94.4 HISTORY OF LIVER TRANSPLANT (HCC): Chronic | Status: ACTIVE | Noted: 2017-05-28

## 2023-01-05 PROBLEM — I10 HTN (HYPERTENSION): Chronic | Status: ACTIVE | Noted: 2018-11-07

## 2023-01-05 PROBLEM — E87.1 HYPONATREMIA: Status: RESOLVED | Noted: 2023-01-04 | Resolved: 2023-01-05

## 2023-01-05 PROBLEM — K75.4 AUTOIMMUNE HEPATITIS (HCC): Chronic | Status: ACTIVE | Noted: 2017-05-28

## 2023-01-05 PROBLEM — N18.31 STAGE 3A CHRONIC KIDNEY DISEASE (HCC): Chronic | Status: ACTIVE | Noted: 2023-01-05

## 2023-01-05 PROBLEM — Z94.0 HISTORY OF KIDNEY TRANSPLANT: Chronic | Status: ACTIVE | Noted: 2017-10-31

## 2023-01-05 PROBLEM — A41.50 SEPSIS DUE TO GRAM-NEGATIVE UTI (HCC): Status: ACTIVE | Noted: 2019-12-10

## 2023-01-05 PROBLEM — D72.829 LEUKOCYTOSIS: Status: RESOLVED | Noted: 2023-01-04 | Resolved: 2023-01-05

## 2023-01-05 PROBLEM — N18.31 STAGE 3A CHRONIC KIDNEY DISEASE (HCC): Status: ACTIVE | Noted: 2023-01-05

## 2023-01-05 LAB
ALBUMIN SERPL-MCNC: 2.3 G/DL (ref 3.5–5)
ALBUMIN/GLOB SERPL: 0.6 (ref 0.4–1.6)
ALP SERPL-CCNC: 47 U/L (ref 50–136)
ALT SERPL-CCNC: 22 U/L (ref 12–65)
ANION GAP SERPL CALC-SCNC: 11 MMOL/L (ref 2–11)
AST SERPL-CCNC: 19 U/L (ref 15–37)
BASOPHILS # BLD: 0 K/UL (ref 0–0.2)
BASOPHILS NFR BLD: 0 % (ref 0–2)
BILIRUB SERPL-MCNC: 0.5 MG/DL (ref 0.2–1.1)
BUN SERPL-MCNC: 17 MG/DL (ref 6–23)
CALCIUM SERPL-MCNC: 7.3 MG/DL (ref 8.3–10.4)
CHLORIDE SERPL-SCNC: 108 MMOL/L (ref 101–110)
CO2 SERPL-SCNC: 18 MMOL/L (ref 21–32)
CREAT SERPL-MCNC: 1.21 MG/DL (ref 0.6–1)
DIFFERENTIAL METHOD BLD: ABNORMAL
EOSINOPHIL # BLD: 0 K/UL (ref 0–0.8)
EOSINOPHIL NFR BLD: 0 % (ref 0.5–7.8)
ERYTHROCYTE [DISTWIDTH] IN BLOOD BY AUTOMATED COUNT: 13.1 % (ref 11.9–14.6)
GLOBULIN SER CALC-MCNC: 4.1 G/DL (ref 2.8–4.5)
GLUCOSE SERPL-MCNC: 104 MG/DL (ref 65–100)
HCT VFR BLD AUTO: 30.8 % (ref 35.8–46.3)
HGB BLD-MCNC: 10.4 G/DL (ref 11.7–15.4)
IMM GRANULOCYTES # BLD AUTO: 0 K/UL (ref 0–0.5)
IMM GRANULOCYTES NFR BLD AUTO: 0 % (ref 0–5)
LYMPHOCYTES # BLD: 1.2 K/UL (ref 0.5–4.6)
LYMPHOCYTES NFR BLD: 17 % (ref 13–44)
MAGNESIUM SERPL-MCNC: 1.2 MG/DL (ref 1.8–2.4)
MCH RBC QN AUTO: 30.2 PG (ref 26.1–32.9)
MCHC RBC AUTO-ENTMCNC: 33.8 G/DL (ref 31.4–35)
MCV RBC AUTO: 89.5 FL (ref 82–102)
MONOCYTES # BLD: 1.1 K/UL (ref 0.1–1.3)
MONOCYTES NFR BLD: 15 % (ref 4–12)
NEUTS SEG # BLD: 4.9 K/UL (ref 1.7–8.2)
NEUTS SEG NFR BLD: 68 % (ref 43–78)
NRBC # BLD: 0 K/UL (ref 0–0.2)
PLATELET # BLD AUTO: 121 K/UL (ref 150–450)
PMV BLD AUTO: 11.5 FL (ref 9.4–12.3)
POTASSIUM SERPL-SCNC: 3.2 MMOL/L (ref 3.5–5.1)
PROT SERPL-MCNC: 6.4 G/DL (ref 6.3–8.2)
RBC # BLD AUTO: 3.44 M/UL (ref 4.05–5.2)
SODIUM SERPL-SCNC: 137 MMOL/L (ref 133–143)
WBC # BLD AUTO: 7.2 K/UL (ref 4.3–11.1)

## 2023-01-05 PROCEDURE — 6370000000 HC RX 637 (ALT 250 FOR IP): Performed by: INTERNAL MEDICINE

## 2023-01-05 PROCEDURE — 6360000002 HC RX W HCPCS: Performed by: INTERNAL MEDICINE

## 2023-01-05 PROCEDURE — 85025 COMPLETE CBC W/AUTO DIFF WBC: CPT

## 2023-01-05 PROCEDURE — 36415 COLL VENOUS BLD VENIPUNCTURE: CPT

## 2023-01-05 PROCEDURE — 6370000000 HC RX 637 (ALT 250 FOR IP): Performed by: FAMILY MEDICINE

## 2023-01-05 PROCEDURE — 80053 COMPREHEN METABOLIC PANEL: CPT

## 2023-01-05 PROCEDURE — 1100000000 HC RM PRIVATE

## 2023-01-05 PROCEDURE — 2580000003 HC RX 258: Performed by: FAMILY MEDICINE

## 2023-01-05 PROCEDURE — 6360000002 HC RX W HCPCS: Performed by: FAMILY MEDICINE

## 2023-01-05 PROCEDURE — 6370000000 HC RX 637 (ALT 250 FOR IP): Performed by: HOSPITALIST

## 2023-01-05 PROCEDURE — 83735 ASSAY OF MAGNESIUM: CPT

## 2023-01-05 RX ORDER — MAGNESIUM SULFATE IN WATER 40 MG/ML
2000 INJECTION, SOLUTION INTRAVENOUS ONCE
Status: COMPLETED | OUTPATIENT
Start: 2023-01-05 | End: 2023-01-05

## 2023-01-05 RX ORDER — ZOLPIDEM TARTRATE 5 MG/1
5 TABLET ORAL NIGHTLY PRN
Status: DISCONTINUED | OUTPATIENT
Start: 2023-01-05 | End: 2023-01-06 | Stop reason: HOSPADM

## 2023-01-05 RX ORDER — POTASSIUM CHLORIDE 20 MEQ/1
40 TABLET, EXTENDED RELEASE ORAL ONCE
Status: COMPLETED | OUTPATIENT
Start: 2023-01-05 | End: 2023-01-05

## 2023-01-05 RX ORDER — ENOXAPARIN SODIUM 100 MG/ML
40 INJECTION SUBCUTANEOUS DAILY
Status: DISCONTINUED | OUTPATIENT
Start: 2023-01-05 | End: 2023-01-06 | Stop reason: HOSPADM

## 2023-01-05 RX ADMIN — MAGNESIUM SULFATE HEPTAHYDRATE 2000 MG: 40 INJECTION, SOLUTION INTRAVENOUS at 17:38

## 2023-01-05 RX ADMIN — TACROLIMUS 1 MG: 1 CAPSULE ORAL at 09:14

## 2023-01-05 RX ADMIN — SODIUM CHLORIDE, PRESERVATIVE FREE 10 ML: 5 INJECTION INTRAVENOUS at 09:14

## 2023-01-05 RX ADMIN — TACROLIMUS 1 MG: 1 CAPSULE ORAL at 20:49

## 2023-01-05 RX ADMIN — ZOLPIDEM TARTRATE 5 MG: 5 TABLET ORAL at 01:20

## 2023-01-05 RX ADMIN — AMLODIPINE BESYLATE 10 MG: 10 TABLET ORAL at 09:13

## 2023-01-05 RX ADMIN — MYCOPHENOLATE MOFETIL 1000 MG: 500 TABLET, FILM COATED ORAL at 20:50

## 2023-01-05 RX ADMIN — POTASSIUM CHLORIDE 40 MEQ: 1500 TABLET, EXTENDED RELEASE ORAL at 20:50

## 2023-01-05 RX ADMIN — CEFTRIAXONE 1000 MG: 1 INJECTION, POWDER, FOR SOLUTION INTRAMUSCULAR; INTRAVENOUS at 05:48

## 2023-01-05 RX ADMIN — SODIUM CHLORIDE, PRESERVATIVE FREE 10 ML: 5 INJECTION INTRAVENOUS at 20:50

## 2023-01-05 RX ADMIN — ENOXAPARIN SODIUM 40 MG: 100 INJECTION SUBCUTANEOUS at 09:19

## 2023-01-05 RX ADMIN — POTASSIUM CHLORIDE 40 MEQ: 1500 TABLET, EXTENDED RELEASE ORAL at 09:20

## 2023-01-05 RX ADMIN — MYCOPHENOLATE MOFETIL 1000 MG: 500 TABLET, FILM COATED ORAL at 09:14

## 2023-01-05 ASSESSMENT — PAIN SCALES - GENERAL
PAINLEVEL_OUTOF10: 0
PAINLEVEL_OUTOF10: 0

## 2023-01-05 NOTE — CARE COORDINATION
Discharge planning was discussed with the Interdisciplinary Team. The patient is a potential discharge home today. No case management needs are anticipated.

## 2023-01-05 NOTE — PROGRESS NOTES
Hospitalist Progress Note   Admit Date:  1/3/2023  7:44 PM   Name:  Marla Pretty   Age:  36 y.o. Sex:  female  :  1980   MRN:  [id number]   Room:  361/    Presenting Complaint: Emesis     Reason(s) for Admission: Fever and chills [R50.9]  S/P kidney transplant [Z94.0]  S/P liver transplant ([facility] [Z94.4]  Fever, unspecified fever cause [R50.9]  Intractable vomiting with nausea [R11.2]     Hospital Course:   Marla Pretty is a 36 y.o. female with medical history of HTN, liver and kidney transplant, who presented with vomiting, fevers, chills. Found to have sepsis from UTI in ER. Admitted and started on rocephin. Subjective & 24hr Events (23): Feeling better. No more vomiting. Is taking some PO. Due for 2nd dose rocephin this morning. No other complaints. Assessment & Plan:       Sepsis due to gram-negative UTI ([facility]  -cont rocephin  -unfortunately no urine culture was sent in ER so treating empirically. If improves on rocephin can go home on similar abx      Fever and chills  -fever last night 8pm  -treat infection      Hypokalemia  -replace with 40meq today  -recheck BMP in AM      HTN (hypertension)  -Controlled. Continue home meds      History of liver transplant ([facility]    History of kidney transplant  -cont home cellcept and tacrolimus      Anticipated discharge needs:    -home when clinically improved    Diet:  ADULT DIET;  Regular  DVT PPx:lovenox  Code status: Full Code    Hospital Problems:  Principal Problem:    Sepsis due to gram-negative UTI [facility])  Active Problems:    Fever and chills    Hypokalemia    History of liver transplant ([facility]    HTN (hypertension)    History of kidney transplant    Autoimmune hepatitis ([facility]  Resolved Problems:    Hyponatremia    Leukocytosis      Objective:   Patient Vitals for the past 24 hrs:   Temp Pulse Resp BP SpO2   23 1131 98.4 °F (36.9 °C) 93 19 110/76 97 %   23 0805 99.9 °F (37.7 °C) 91 22 124/85 95 %   23 0438 99.9 °F (37.7 °C) 90 18 123/78 95 %   01/04/23 2252 (!) 100.6 °F (38.1 °C) 88 22 109/77 94 %   01/04/23 2003 (!) 103.1 °F (39.5 °C) 96 20 132/82 94 %   01/04/23 1736 -- (!) 111 -- 117/72 --   01/04/23 1553 99 °F (37.2 °C) (!) 111 -- 117/72 96 %       Oxygen Therapy  SpO2: 97 %  Pulse via Oximetry: 90 beats per minute  O2 Device: None (Room air)    Estimated body mass index is 29.18 kg/m² as calculated from the following:    Height as of this encounter: 5' 4\" (1.626 m). Weight as of this encounter: 170 lb (77.1 kg). No intake or output data in the 24 hours ending 01/05/23 1509      Physical Exam:     Blood pressure 110/76, pulse 93, temperature 98.4 °F (36.9 °C), temperature source Oral, resp. rate 19, height 5' 4\" (1.626 m), weight 170 lb (77.1 kg), SpO2 97 %. General:    Well nourished. Head:  Normocephalic, atraumatic  Eyes:  Sclerae appear normal.  Pupils equally round. ENT:  Nares appear normal.    Neck:  No restricted ROM. CV:   RRR. No jugular venous distension. Lungs:   No distress. Symmetric expansion. Abdomen:   nondistended. Extremities: No cyanosis or clubbing. No edema  Skin:     No rashes and normal coloration. Neuro:  CN II-XII grossly intact. Psych:  Normal mood and affect.       I have personally reviewed labs and tests showing:  Recent Labs:  Recent Results (from the past 48 hour(s))   CBC with Auto Differential    Collection Time: 01/03/23 10:21 PM   Result Value Ref Range    WBC 11.3 (H) 4.3 - 11.1 K/uL    RBC 3.55 (L) 4.05 - 5.2 M/uL    Hemoglobin 10.7 (L) 11.7 - 15.4 g/dL    Hematocrit 31.7 (L) 35.8 - 46.3 %    MCV 89.3 82.0 - 102.0 FL    MCH 30.1 26.1 - 32.9 PG    MCHC 33.8 31.4 - 35.0 g/dL    RDW 12.9 11.9 - 14.6 %    Platelets 779 (L) 446 - 450 K/uL    MPV 11.2 9.4 - 12.3 FL    nRBC 0.00 0.0 - 0.2 K/uL    Differential Type AUTOMATED      Seg Neutrophils 81 (H) 43 - 78 %    Lymphocytes 7 (L) 13 - 44 %    Monocytes 11 4.0 - 12.0 %    Eosinophils % 0 (L) 0.5 - 7.8 % Basophils 0 0.0 - 2.0 %    Immature Granulocytes 1 0.0 - 5.0 %    Segs Absolute 9.1 (H) 1.7 - 8.2 K/UL    Absolute Lymph # 0.8 0.5 - 4.6 K/UL    Absolute Mono # 1.3 0.1 - 1.3 K/UL    Absolute Eos # 0.0 0.0 - 0.8 K/UL    Basophils Absolute 0.0 0.0 - 0.2 K/UL    Absolute Immature Granulocyte 0.1 0.0 - 0.5 K/UL   Comprehensive Metabolic Panel    Collection Time: 01/03/23 10:21 PM   Result Value Ref Range    Sodium 131 (L) 133 - 143 mmol/L    Potassium 3.1 (L) 3.5 - 5.1 mmol/L    Chloride 97 (L) 101 - 110 mmol/L    CO2 22 21 - 32 mmol/L    Anion Gap 12 (H) 2 - 11 mmol/L    Glucose 122 (H) 65 - 100 mg/dL    BUN 26 (H) 6 - 23 MG/DL    Creatinine 1.50 (H) 0.6 - 1.0 MG/DL    Est, Glom Filt Rate 44 (L) >60 ml/min/1.73m2    Calcium 7.9 (L) 8.3 - 10.4 MG/DL    Total Bilirubin 1.0 0.2 - 1.1 MG/DL    ALT 24 12 - 65 U/L    AST 20 15 - 37 U/L    Alk Phosphatase 54 50 - 136 U/L    Total Protein 7.4 6.3 - 8.2 g/dL    Albumin 2.8 (L) 3.5 - 5.0 g/dL    Globulin 4.6 (H) 2.8 - 4.5 g/dL    Albumin/Globulin Ratio 0.6 0.4 - 1.6     Lipase    Collection Time: 01/03/23 10:21 PM   Result Value Ref Range    Lipase 106 73 - 393 U/L   Magnesium    Collection Time: 01/03/23 10:21 PM   Result Value Ref Range    Magnesium 0.9 (L) 1.8 - 2.4 mg/dL   Rapid influenza A/B antigens    Collection Time: 01/03/23 10:21 PM    Specimen: Nasal Washing   Result Value Ref Range    Influenza A Ag Negative NEG      Influenza B Ag Negative NEG      Source Nasopharyngeal     COVID-19, Rapid    Collection Time: 01/03/23 10:21 PM    Specimen: Nasopharyngeal   Result Value Ref Range    Source Nasopharyngeal      SARS-CoV-2, Rapid Not detected NOTD     Urinalysis    Collection Time: 01/03/23 10:21 PM   Result Value Ref Range    Color, UA YELLOW/STRAW      Appearance CLOUDY      Specific Gravity, UA 1.017 1.001 - 1.023      pH, Urine 6.0 5.0 - 9.0      Protein,  (A) NEG mg/dL    Glucose, UA Negative mg/dL    Ketones, Urine 40 (A) NEG mg/dL    Bilirubin Urine Negative NEG      Blood, Urine TRACE (A) NEG      Urobilinogen, Urine 1.0 0.2 - 1.0 EU/dL    Nitrite, Urine Negative NEG      Leukocyte Esterase, Urine TRACE (A) NEG      WBC, UA 10-20 (A) U4 /hpf    RBC, UA 0-5 U5 /hpf    Epithelial Cells UA 20-50 (A) U5 /hpf    BACTERIA, URINE 3+ (A) NEG /hpf    Casts 0-2 U2 /lpf   Culture, Blood 1    Collection Time: 01/03/23 11:36 PM    Specimen: Blood   Result Value Ref Range    Special Requests NO SPECIAL REQUESTS  LEFT  Antecubital        Culture NO GROWTH 2 DAYS     Comprehensive Metabolic Panel w/ Reflex to MG    Collection Time: 01/05/23  6:11 AM   Result Value Ref Range    Sodium 137 133 - 143 mmol/L    Potassium 3.2 (L) 3.5 - 5.1 mmol/L    Chloride 108 101 - 110 mmol/L    CO2 18 (L) 21 - 32 mmol/L    Anion Gap 11 2 - 11 mmol/L    Glucose 104 (H) 65 - 100 mg/dL    BUN 17 6 - 23 MG/DL    Creatinine 1.21 (H) 0.6 - 1.0 MG/DL    Est, Glom Filt Rate 57 (L) >60 ml/min/1.73m2    Calcium 7.3 (L) 8.3 - 10.4 MG/DL    Total Bilirubin 0.5 0.2 - 1.1 MG/DL    ALT 22 12 - 65 U/L    AST 19 15 - 37 U/L    Alk Phosphatase 47 (L) 50 - 136 U/L    Total Protein 6.4 6.3 - 8.2 g/dL    Albumin 2.3 (L) 3.5 - 5.0 g/dL    Globulin 4.1 2.8 - 4.5 g/dL    Albumin/Globulin Ratio 0.6 0.4 - 1.6     CBC with Auto Differential    Collection Time: 01/05/23  6:11 AM   Result Value Ref Range    WBC 7.2 4.3 - 11.1 K/uL    RBC 3.44 (L) 4.05 - 5.2 M/uL    Hemoglobin 10.4 (L) 11.7 - 15.4 g/dL    Hematocrit 30.8 (L) 35.8 - 46.3 %    MCV 89.5 82.0 - 102.0 FL    MCH 30.2 26.1 - 32.9 PG    MCHC 33.8 31.4 - 35.0 g/dL    RDW 13.1 11.9 - 14.6 %    Platelets 869 (L) 109 - 450 K/uL    MPV 11.5 9.4 - 12.3 FL    nRBC 0.00 0.0 - 0.2 K/uL    Differential Type AUTOMATED      Seg Neutrophils 68 43 - 78 %    Lymphocytes 17 13 - 44 %    Monocytes 15 (H) 4.0 - 12.0 %    Eosinophils % 0 (L) 0.5 - 7.8 %    Basophils 0 0.0 - 2.0 %    Immature Granulocytes 0 0.0 - 5.0 %    Segs Absolute 4.9 1.7 - 8.2 K/UL    Absolute Lymph # 1.2 0.5 - 4.6 K/UL    Absolute Mono # 1.1 0.1 - 1.3 K/UL    Absolute Eos # 0.0 0.0 - 0.8 K/UL    Basophils Absolute 0.0 0.0 - 0.2 K/UL    Absolute Immature Granulocyte 0.0 0.0 - 0.5 K/UL   Magnesium    Collection Time: 01/05/23  6:11 AM   Result Value Ref Range    Magnesium 1.2 (L) 1.8 - 2.4 mg/dL       I have personally reviewed imaging studies showing:   Other Studies:  No orders to display       Current Meds:  Current Facility-Administered Medications   Medication Dose Route Frequency    zolpidem (AMBIEN) tablet 5 mg  5 mg Oral Nightly PRN    enoxaparin (LOVENOX) injection 40 mg  40 mg SubCUTAneous Daily    amLODIPine (NORVASC) tablet 10 mg  10 mg Oral Daily    mycophenolate (CELLCEPT) tablet 1,000 mg  1,000 mg Oral BID    tacrolimus (PROGRAF) capsule 1 mg  1 mg Oral BID    sodium chloride flush 0.9 % injection 5-40 mL  5-40 mL IntraVENous 2 times per day    sodium chloride flush 0.9 % injection 5-40 mL  5-40 mL IntraVENous PRN    potassium chloride (KLOR-CON M) extended release tablet 40 mEq  40 mEq Oral PRN    Or    potassium bicarb-citric acid (EFFER-K) effervescent tablet 40 mEq  40 mEq Oral PRN    Or    potassium chloride 10 mEq/100 mL IVPB (Peripheral Line)  10 mEq IntraVENous PRN    magnesium sulfate 2000 mg in 50 mL IVPB premix  2,000 mg IntraVENous PRN    ondansetron (ZOFRAN-ODT) disintegrating tablet 4 mg  4 mg Oral Q8H PRN    Or    ondansetron (ZOFRAN) injection 4 mg  4 mg IntraVENous Q6H PRN    polyethylene glycol (GLYCOLAX) packet 17 g  17 g Oral Daily PRN    acetaminophen (TYLENOL) tablet 650 mg  650 mg Oral Q6H PRN    Or    acetaminophen (TYLENOL) suppository 650 mg  650 mg Rectal Q6H PRN    cefTRIAXone (ROCEPHIN) 1,000 mg in sodium chloride 0.9 % 50 mL IVPB mini-bag  1,000 mg IntraVENous Q24H    ondansetron (ZOFRAN) injection 4 mg  4 mg IntraVENous Once    promethazine (PHENERGAN) injection 12.5 mg  12.5 mg IntraMUSCular Q6H PRN       Signed:  Victor M Ramirez MD    Part of this note may have been written by using a voice dictation software. The note has been proof read but may still contain some grammatical/other typographical errors.

## 2023-01-06 VITALS
TEMPERATURE: 98.2 F | OXYGEN SATURATION: 97 % | SYSTOLIC BLOOD PRESSURE: 90 MMHG | HEART RATE: 91 BPM | HEIGHT: 64 IN | DIASTOLIC BLOOD PRESSURE: 62 MMHG | WEIGHT: 170 LBS | BODY MASS INDEX: 29.02 KG/M2 | RESPIRATION RATE: 20 BRPM

## 2023-01-06 LAB
ANION GAP SERPL CALC-SCNC: 9 MMOL/L (ref 2–11)
BASOPHILS # BLD: 0 K/UL (ref 0–0.2)
BASOPHILS NFR BLD: 1 % (ref 0–2)
BUN SERPL-MCNC: 13 MG/DL (ref 6–23)
CALCIUM SERPL-MCNC: 8 MG/DL (ref 8.3–10.4)
CHLORIDE SERPL-SCNC: 110 MMOL/L (ref 101–110)
CO2 SERPL-SCNC: 22 MMOL/L (ref 21–32)
CREAT SERPL-MCNC: 1.1 MG/DL (ref 0.6–1)
DIFFERENTIAL METHOD BLD: ABNORMAL
EOSINOPHIL # BLD: 0.2 K/UL (ref 0–0.8)
EOSINOPHIL NFR BLD: 2 % (ref 0.5–7.8)
ERYTHROCYTE [DISTWIDTH] IN BLOOD BY AUTOMATED COUNT: 13.2 % (ref 11.9–14.6)
GLUCOSE SERPL-MCNC: 114 MG/DL (ref 65–100)
HCT VFR BLD AUTO: 29.7 % (ref 35.8–46.3)
HGB BLD-MCNC: 10 G/DL (ref 11.7–15.4)
IMM GRANULOCYTES # BLD AUTO: 0 K/UL (ref 0–0.5)
IMM GRANULOCYTES NFR BLD AUTO: 0 % (ref 0–5)
LYMPHOCYTES # BLD: 1.7 K/UL (ref 0.5–4.6)
LYMPHOCYTES NFR BLD: 26 % (ref 13–44)
MCH RBC QN AUTO: 29.9 PG (ref 26.1–32.9)
MCHC RBC AUTO-ENTMCNC: 33.7 G/DL (ref 31.4–35)
MCV RBC AUTO: 88.9 FL (ref 82–102)
MONOCYTES # BLD: 0.8 K/UL (ref 0.1–1.3)
MONOCYTES NFR BLD: 12 % (ref 4–12)
NEUTS SEG # BLD: 3.8 K/UL (ref 1.7–8.2)
NEUTS SEG NFR BLD: 59 % (ref 43–78)
NRBC # BLD: 0 K/UL (ref 0–0.2)
PLATELET # BLD AUTO: 152 K/UL (ref 150–450)
PMV BLD AUTO: 11.9 FL (ref 9.4–12.3)
POTASSIUM SERPL-SCNC: 3.6 MMOL/L (ref 3.5–5.1)
RBC # BLD AUTO: 3.34 M/UL (ref 4.05–5.2)
SODIUM SERPL-SCNC: 141 MMOL/L (ref 133–143)
WBC # BLD AUTO: 6.4 K/UL (ref 4.3–11.1)

## 2023-01-06 PROCEDURE — 6360000002 HC RX W HCPCS: Performed by: FAMILY MEDICINE

## 2023-01-06 PROCEDURE — 2580000003 HC RX 258: Performed by: FAMILY MEDICINE

## 2023-01-06 PROCEDURE — 6370000000 HC RX 637 (ALT 250 FOR IP): Performed by: FAMILY MEDICINE

## 2023-01-06 PROCEDURE — 36415 COLL VENOUS BLD VENIPUNCTURE: CPT

## 2023-01-06 PROCEDURE — 80048 BASIC METABOLIC PNL TOTAL CA: CPT

## 2023-01-06 PROCEDURE — 6360000002 HC RX W HCPCS: Performed by: INTERNAL MEDICINE

## 2023-01-06 PROCEDURE — 85025 COMPLETE CBC W/AUTO DIFF WBC: CPT

## 2023-01-06 PROCEDURE — 94760 N-INVAS EAR/PLS OXIMETRY 1: CPT

## 2023-01-06 RX ORDER — CEFPODOXIME PROXETIL 200 MG/1
200 TABLET, FILM COATED ORAL 2 TIMES DAILY
Qty: 8 TABLET | Refills: 0 | Status: SHIPPED | OUTPATIENT
Start: 2023-01-06 | End: 2023-01-10

## 2023-01-06 RX ADMIN — CEFTRIAXONE 1000 MG: 1 INJECTION, POWDER, FOR SOLUTION INTRAMUSCULAR; INTRAVENOUS at 05:51

## 2023-01-06 RX ADMIN — AMLODIPINE BESYLATE 10 MG: 10 TABLET ORAL at 08:51

## 2023-01-06 RX ADMIN — TACROLIMUS 1 MG: 1 CAPSULE ORAL at 08:52

## 2023-01-06 RX ADMIN — MYCOPHENOLATE MOFETIL 1000 MG: 500 TABLET, FILM COATED ORAL at 08:51

## 2023-01-06 RX ADMIN — SODIUM CHLORIDE, PRESERVATIVE FREE 5 ML: 5 INJECTION INTRAVENOUS at 08:52

## 2023-01-06 RX ADMIN — ENOXAPARIN SODIUM 40 MG: 100 INJECTION SUBCUTANEOUS at 08:52

## 2023-01-06 ASSESSMENT — PAIN SCALES - GENERAL
PAINLEVEL_OUTOF10: 0
PAINLEVEL_OUTOF10: 0

## 2023-01-06 NOTE — DISCHARGE SUMMARY
Hospitalist Discharge Summary   Admit Date:  1/3/2023  7:44 PM   DC Note date: 2023  Name:  January Nelson   Age:  36 y.o. Sex:  female  :  1980   MRN:  [id number]   Room:  [facility]  PCP:  Poncho Watts MD    Presenting Complaint: Emesis     Initial Admission Diagnosis: Fever and chills [R50.9]  S/P kidney transplant [Z94.0]  S/P liver transplant ([facility] [Z94.4]  Fever, unspecified fever cause [R50.9]  Intractable vomiting with nausea [R11.2]     Problem List for this Hospitalization (present on admission):    Principal Problem:    Sepsis due to gram-negative UTI ([facility]  Active Problems:    Fever and chills    Hypokalemia    Stage 3a chronic kidney disease ([facility]    History of liver transplant ([facility]    HTN (hypertension)    History of kidney transplant    Autoimmune hepatitis ([facility]  Resolved Problems:    Hyponatremia    Leukocytosis      Hospital Course:  Please refer to the admission H&P for details of presentation. In summary, January Nelson is a 36 y.o. female with past medical history significant for HTN, liver and kidney transplant, who presented with vomiting, fevers, chills. Found to have sepsis from UTI in ER. Admitted and started on rocephin. Nausea resolved and tolerating PO. Blood culture without any growth. No urine culture was collected prior to start of abx. Symptoms have improved and she has been hemodynamically stable without any fever. Patient is medically stable for discharge. Patient is to continue taking medications as prescribed and to follow up with PCP on discharge. Patient is instructed to to call a physician or return to ED if any concerns/symptoms worsened. Discharge summary and encounter summary was sent to PCP electronically via \"Comm Mgt\" link in [facility], if possible. Disposition: Home  Diet: ADULT DIET; Regular  Code Status: Full Code    Follow Ups:   Follow-up Information     Poncho Watts MD Follow up in 1 week(s).     Specialty: Family Medicine  Why: As needed  Contact information:  [address]  [phone]                       Time spent in patient discharge and coordination 36 minutes. Plan was discussed with patient. All questions answered. Patient was stable at time of discharge. Instructions given to call a physician or return if any concerns. Current Discharge Medication List        START taking these medications    Details   cefpodoxime (VANTIN) 200 MG tablet Take 1 tablet by mouth 2 times daily for 4 days  Qty: 8 tablet, Refills: 0           CONTINUE these medications which have NOT CHANGED    Details   amLODIPine (NORVASC) 10 MG tablet Take 10 mg by mouth daily      mycophenolate (CELLCEPT) 500 MG tablet Take 1,000 mg by mouth 2 times daily      tacrolimus (PROGRAF) 1 MG capsule Take 1 mg by mouth 2 times daily           STOP taking these medications       predniSONE (DELTASONE) 10 MG tablet Comments:   Reason for Stopping:               Procedures done this admission:  * No surgery found *    Consults this admission:  None    Echocardiogram results:  No results found for this or any previous visit. Diagnostic Imaging/Tests:   CT ABDOMEN PELVIS WO CONTRAST Additional Contrast? None    Result Date: 1/2/2023  1. No suggestion of acute intra-abdominal abnormality. 2.  Postsurgical changes compatible with liver transplantation and kidney transplantation. 3.  Ventral abdominal wall hernias, 2 of which contain a loop of bowel, without evidence for bowel obstruction. 4.  Splenomegaly. XR LUMBAR SPINE (2-3 VIEWS)    Result Date: 1/2/2023  No acute osseous abnormality of the lumbar spine. XR FOOT RIGHT (MIN 3 VIEWS)    Result Date: 1/2/2023  No acute osseous abnormality.         Labs: Results:       BMP, Mg, Phos Recent Labs     01/03/23  2221 01/05/23  0611 01/06/23  0607   * 137 141   K 3.1* 3.2* 3.6   CL 97* 108 110   CO2 22 18* 22   ANIONGAP 12* 11 9   BUN 26* 17 13   CREATININE 1.50* 1.21* 1.10*   LABGLOM 44* 57* >60   CALCIUM 7.9* 7.3* 8.0*   GLUCOSE 122* 104* 114*   MG 0.9* 1.2*  --       CBC Recent Labs     01/03/23 2221 01/05/23  0611 01/06/23  0607   WBC 11.3* 7.2 6.4   RBC 3.55* 3.44* 3.34*   HGB 10.7* 10.4* 10.0*   HCT 31.7* 30.8* 29.7*   MCV 89.3 89.5 88.9   MCH 30.1 30.2 29.9   MCHC 33.8 33.8 33.7   RDW 12.9 13.1 13.2   * 121* 152   MPV 11.2 11.5 11.9   NRBC 0.00 0.00 0.00   SEGS 81* 68 59   LYMPHOPCT 7* 17 26   EOSRELPCT 0* 0* 2   MONOPCT 11 15* 12   BASOPCT 0 0 1   IMMGRAN 1 0 0   SEGSABS 9.1* 4.9 3.8   LYMPHSABS 0.8 1.2 1.7   EOSABS 0.0 0.0 0.2   MONOSABS 1.3 1.1 0.8   BASOSABS 0.0 0.0 0.0   ABSIMMGRAN 0.1 0.0 0.0      LFT Recent Labs     01/03/23 2221 01/05/23  0611   BILITOT 1.0 0.5   ALKPHOS 54 47*   AST 20 19   ALT 24 22   PROT 7.4 6.4   LABALBU 2.8* 2.3*   GLOB 4.6* 4.1      Cardiac  No results found for: NTPROBNP, TROPHS   Coags No results found for: PROTIME, INR, APTT   A1c No results found for: LABA1C, EAG   Lipids No results found for: CHOL, LDLCALC, LABVLDL, HDL, CHOLHDLRATIO, TRIG   Thyroid  No results found for: Debbi Toth     Most Recent UA Lab Results   Component Value Date/Time    COLORU YELLOW/STRAW 01/03/2023 10:21 PM    APPEARANCE CLOUDY 01/03/2023 10:21 PM    SPECGRAV 1.017 01/03/2023 10:21 PM    LABPH 6.0 01/03/2023 10:21 PM    PROTEINU 100 01/03/2023 10:21 PM    GLUCOSEU Negative 01/03/2023 10:21 PM    KETUA 40 01/03/2023 10:21 PM    BILIRUBINUR Negative 01/03/2023 10:21 PM    BILIRUBINUR SMALL 12/09/2019 07:04 PM    BLOODU TRACE 01/03/2023 10:21 PM    UROBILINOGEN 1.0 01/03/2023 10:21 PM    NITRU Negative 01/03/2023 10:21 PM    LEUKOCYTESUR TRACE 01/03/2023 10:21 PM    WBCUA 10-20 01/03/2023 10:21 PM    RBCUA 0-5 01/03/2023 10:21 PM    EPITHUA 20-50 01/03/2023 10:21 PM    BACTERIA 3+ 01/03/2023 10:21 PM    LABCAST 0-2 01/03/2023 10:21 PM        Recent Labs     01/03/23  2336   CULTURE NO GROWTH 2 DAYS       All Labs from Last 24 Hrs:  Recent Results (from the past 24 hour(s))   CBC with Auto Differential    Collection Time: 01/06/23  6:07 AM   Result Value Ref Range    WBC 6.4 4.3 - 11.1 K/uL    RBC 3.34 (L) 4.05 - 5.2 M/uL    Hemoglobin 10.0 (L) 11.7 - 15.4 g/dL    Hematocrit 29.7 (L) 35.8 - 46.3 %    MCV 88.9 82.0 - 102.0 FL    MCH 29.9 26.1 - 32.9 PG    MCHC 33.7 31.4 - 35.0 g/dL    RDW 13.2 11.9 - 14.6 %    Platelets 198 466 - 855 K/uL    MPV 11.9 9.4 - 12.3 FL    nRBC 0.00 0.0 - 0.2 K/uL    Differential Type AUTOMATED      Seg Neutrophils 59 43 - 78 %    Lymphocytes 26 13 - 44 %    Monocytes 12 4.0 - 12.0 %    Eosinophils % 2 0.5 - 7.8 %    Basophils 1 0.0 - 2.0 %    Immature Granulocytes 0 0.0 - 5.0 %    Segs Absolute 3.8 1.7 - 8.2 K/UL    Absolute Lymph # 1.7 0.5 - 4.6 K/UL    Absolute Mono # 0.8 0.1 - 1.3 K/UL    Absolute Eos # 0.2 0.0 - 0.8 K/UL    Basophils Absolute 0.0 0.0 - 0.2 K/UL    Absolute Immature Granulocyte 0.0 0.0 - 0.5 K/UL   Basic Metabolic Panel w/ Reflex to MG    Collection Time: 01/06/23  6:07 AM   Result Value Ref Range    Sodium 141 133 - 143 mmol/L    Potassium 3.6 3.5 - 5.1 mmol/L    Chloride 110 101 - 110 mmol/L    CO2 22 21 - 32 mmol/L    Anion Gap 9 2 - 11 mmol/L    Glucose 114 (H) 65 - 100 mg/dL    BUN 13 6 - 23 MG/DL    Creatinine 1.10 (H) 0.6 - 1.0 MG/DL    Est, Glom Filt Rate >60 >60 ml/min/1.73m2    Calcium 8.0 (L) 8.3 - 10.4 MG/DL       No Known Allergies  Immunization History   Administered Date(s) Administered    Influenza Virus Vaccine 02/25/2010    Influenza, FLUARIX, FLULAVAL, FLUZONE (age 10 mo+) AND AFLURIA, (age 1 y+), PF, 0.5mL 11/10/2018    Tst, Unspecified Formulation 06/14/2017       Recent Vital Data:  Patient Vitals for the past 24 hrs:   Temp Pulse Resp BP SpO2   01/06/23 1104 98.2 °F (36.8 °C) 88 20 90/62 97 %   01/06/23 0726 98.8 °F (37.1 °C) 81 18 108/72 94 %   01/06/23 0318 99.5 °F (37.5 °C) 85 17 108/78 95 %   01/05/23 2333 100 °F (37.8 °C) (!) 101 16 113/75 98 %   01/05/23 1951 99.5 °F (37.5 °C) 91 16 107/75 97 %   01/05/23 1530 99.9 °F (37.7 °C) 91 20 105/72 96 %       Oxygen Therapy  SpO2: 97 %  Pulse via Oximetry: 90 beats per minute  O2 Device: None (Room air)    Estimated body mass index is 29.18 kg/m² as calculated from the following:    Height as of this encounter: 5' 4\" (1.626 m). Weight as of this encounter: 170 lb (77.1 kg). Intake/Output Summary (Last 24 hours) at 1/6/2023 1147  Last data filed at 1/6/2023 0900  Gross per 24 hour   Intake 261.67 ml   Output --   Net 261.67 ml         Physical Exam:    General:    Well nourished. No overt distress  Head:  Normocephalic, atraumatic  Eyes:  Sclerae appear normal.  Pupils equally round. HENT:  Nares appear normal, no drainage. Moist mucous membranes  Neck:  No restricted ROM. Trachea midline  CV:   RRR. No m/r/g. No JVD  Lungs:   CTAB. No wheezing. Respirations even, unlabored  Abdomen:   Soft, nontender, nondistended. Extremities: Warm and dry. No cyanosis or clubbing. No edema. Skin:     No rashes. Normal coloration  Neuro:  CN II-XII grossly intact. Psych:  Normal mood and affect. Signed:  Oz Christian MD    Part of this note may have been written by using a voice dictation software. The note has been proof read but may still contain some grammatical/other typographical errors.

## 2023-01-06 NOTE — CARE COORDINATION
01/06/23 1201   Service Assessment   Patient Orientation Alert and Oriented   Cognition Alert   History Provided By Patient   Primary Caregiver Self   Support Systems Spouse/Significant Other   Patient's Healthcare Decision Maker is: Legal Next of Kin   Prior Functional Level Independent in ADLs/IADLs   Current Functional Level Independent in ADLs/IADLs   Can patient return to prior living arrangement Yes   Ability to make needs known: Good   Family able to assist with home care needs: Yes   Would you like for me to discuss the discharge plan with any other family members/significant others, and if so, who? No   Community Resources None   Social/Functional History   Lives With Significant other   ADL Assistance Independent   Discharge Planning   Type of Residence House   Current Services Prior To Admission None   Potential Assistance Needed N/A   DME Ordered? No   Potential Assistance Purchasing Medications No   Type of Home Care Services None   Patient expects to be discharged to: [facility] Discharge   Transition of Care Consult (CM Consult) N/A   Services At/After Discharge None     RN CM met with the patient at the bedside and discussed discharge planning. She confirmed she plans on returning home with her family. She has health insurance and a PCP. She is independent of ADLs and has transportation home. RN CM encouraged her to ask questions. No case management needs were identified. She verbalized understanding and agreement with the discharge plan.

## 2023-01-06 NOTE — PROGRESS NOTES
To Whom It May Concern:      Niranjan Pretty has been hospitalized at [facility] on 1/3/2023 and is being discharged on 1/6/2023. She has been under the care of the Hospitalist team during her hospitalization. Please excuse her from her work duties during this time. She is cleared to return to work on 1/9/2023. Please feel free to contact us with any concerns/questions.           Zamzam Lima MD  [address]
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
## 2023-01-08 LAB
BACTERIA SPEC CULT: NORMAL
SERVICE CMNT-IMP: NORMAL